# Patient Record
Sex: FEMALE | Race: WHITE | Employment: FULL TIME | URBAN - METROPOLITAN AREA
[De-identification: names, ages, dates, MRNs, and addresses within clinical notes are randomized per-mention and may not be internally consistent; named-entity substitution may affect disease eponyms.]

---

## 2017-10-12 ENCOUNTER — OFFICE VISIT (OUTPATIENT)
Dept: FAMILY MEDICINE CLINIC | Age: 42
End: 2017-10-12

## 2017-10-12 VITALS
RESPIRATION RATE: 16 BRPM | WEIGHT: 189.4 LBS | DIASTOLIC BLOOD PRESSURE: 87 MMHG | BODY MASS INDEX: 27.11 KG/M2 | SYSTOLIC BLOOD PRESSURE: 131 MMHG | HEIGHT: 70 IN | OXYGEN SATURATION: 99 % | TEMPERATURE: 97.1 F | HEART RATE: 82 BPM

## 2017-10-12 DIAGNOSIS — J02.9 SORE THROAT: Primary | ICD-10-CM

## 2017-10-12 DIAGNOSIS — Z23 ENCOUNTER FOR IMMUNIZATION: ICD-10-CM

## 2017-10-12 PROBLEM — F41.1 GAD (GENERALIZED ANXIETY DISORDER): Status: ACTIVE | Noted: 2017-10-12

## 2017-10-12 LAB
S PYO AG THROAT QL: NEGATIVE
VALID INTERNAL CONTROL?: NO

## 2017-10-12 RX ORDER — ESCITALOPRAM OXALATE 10 MG/1
10 TABLET ORAL DAILY
COMMUNITY
End: 2017-12-18 | Stop reason: DRUGHIGH

## 2017-10-12 NOTE — MR AVS SNAPSHOT
Visit Information Date & Time Provider Department Dept. Phone Encounter #  
 10/12/2017 11:00 AM Gilson Winn, [address] [facility] [address] [phone] [id number] Upcoming Health Maintenance Date Due DTaP/Tdap/Td series (1 - Tdap) 11/12/1998 PAP AKA CERVICAL CYTOLOGY 11/12/1998 INFLUENZA AGE 9 TO ADULT 8/1/2017 Allergies as of 10/12/2017  Review Complete On: 10/12/2017 By: Gilson Winn NP Not on File Current Immunizations  Never Reviewed No immunizations on file. Not reviewed this visit You Were Diagnosed With   
  
 Codes Comments Sore throat    -  Primary ICD-10-CM: J02.9 ICD-9-CM: 206 Vitals BP Pulse Temp Resp Height(growth percentile) Weight(growth percentile) 131/87 (BP 1 Location: Left arm, BP Patient Position: Sitting) 82 97.1 °F (36.2 °C) (Oral) 16 5' 9.5\" (1.765 m) 189 lb 6.4 oz (85.9 kg) LMP SpO2 BMI OB Status Smoking Status (LMP Unknown) 99% 27.57 kg/m2 Chemically Induced Never Smoker Vitals History BMI and BSA Data Body Mass Index Body Surface Area  
 27.57 kg/m 2 2.05 m 2 Preferred Pharmacy Pharmacy Name Phone [address], [address] Your Updated Medication List  
  
   
This list is accurate as of: 10/12/17 11:37 AM.  Always use your most recent med list.  
  
  
  
  
 LEXAPRO 10 mg tablet Generic drug:  escitalopram oxalate Take 10 mg by mouth daily. We Performed the Following AMB POC RAPID STREP A [36578 CPT(R)] Introducing [address]! Grant Lundy introduces [employer] patient portal. Now you can access parts of your medical record, email your doctor's office, and request medication refills online. 1. In your internet browser, go to [email or web address] 2. Click on the First Time User? Click Here link in the Sign In box.  You will see the New Member Sign Up page. 3. Enter your BioData Access Code exactly as it appears below. You will not need to use this code after youve completed the sign-up process. If you do not sign up before the expiration date, you must request a new code. · BioData Access Code: [email or web address] Expires: 1/10/2018 10:47 AM 
 
4. Enter the last four digits of your Social Security Number (xxxx) and Date of Birth (mm/dd/yyyy) as indicated and click Submit. You will be taken to the next sign-up page. 5. Create a Audiencet ID. This will be your BioData login ID and cannot be changed, so think of one that is secure and easy to remember. 6. Create a BioData password. You can change your password at any time. 7. Enter your Password Reset Question and Answer. This can be used at a later time if you forget your password. 8. Enter your e-mail address. You will receive e-mail notification when new information is available in [address]. 9. Click Sign Up. You can now view and download portions of your medical record. 10. Click the Download Summary menu link to download a portable copy of your medical information. If you have questions, please visit the Frequently Asked Questions section of the BioData website. Remember, BioData is NOT to be used for urgent needs. For medical emergencies, dial 911. Now available from your iPhone and Android! Please provide this summary of care documentation to your next provider. Your primary care clinician is listed as Sachi Tolentino. If you have any questions after today's visit, please call [phone].

## 2017-10-12 NOTE — PROGRESS NOTES
HISTORY OF PRESENT ILLNESS  Daniela Banks is a 46 y.o. female. HPI  New patient to establish care. Recently located to [place] from [facility]. Over all in good health. C/o sore throat over the past 6 days. Having a mild dry cough and some post nasal drainage. History of chronic sinusitis, had surgery last year. No fever/chills or strep exposure. History of ALFREDO on lexapro. Past Medical History:   Diagnosis Date    Chronic sinusitis      Patient Active Problem List   Diagnosis Code    ALFREDO (generalized anxiety disorder) F41.1     Past Surgical History:   Procedure Laterality Date    HX GYN      HX HEENT  11/2016    sinus surgery     Current Outpatient Prescriptions   Medication Sig    escitalopram oxalate (LEXAPRO) 10 mg tablet Take 10 mg by mouth daily. No current facility-administered medications for this visit. Social History   Substance Use Topics    Smoking status: Never Smoker    Smokeless tobacco: Never Used    Alcohol use No     Visit Vitals    /87 (BP 1 Location: Left arm, BP Patient Position: Sitting)    Pulse 82    Temp 97.1 °F (36.2 °C) (Oral)    Resp 16    Ht 5' 9.5\" (1.765 m)    Wt 189 lb 6.4 oz (85.9 kg)    SpO2 99%    BMI 27.57 kg/m2         Review of Systems   Constitutional: Negative for chills, fever and malaise/fatigue. HENT: Negative for congestion, ear pain and sore throat. Respiratory: Positive for cough. Negative for sputum production, shortness of breath and wheezing. Cardiovascular: Negative for chest pain and palpitations. Psychiatric/Behavioral: The patient is nervous/anxious (Stable on lexapro). All other systems reviewed and are negative. Physical Exam   Constitutional: No distress. HENT:   Right Ear: Tympanic membrane and ear canal normal.   Left Ear: Tympanic membrane and ear canal normal.   Nose: Right sinus exhibits no maxillary sinus tenderness and no frontal sinus tenderness.  Left sinus exhibits no maxillary sinus tenderness and no frontal sinus tenderness. Mouth/Throat: Posterior oropharyngeal erythema present. No oropharyngeal exudate or posterior oropharyngeal edema. Neck: Neck supple. Cardiovascular: Normal rate, regular rhythm and normal heart sounds. Pulmonary/Chest: Effort normal and breath sounds normal.   Lymphadenopathy:     She has no cervical adenopathy. Skin: Skin is warm and dry. ASSESSMENT and PLAN  Diagnoses and all orders for this visit:    1. Sore throat  -     AMB POC RAPID STREP A    Rapid strep negative. May be secondary to drainage or mild URI. Trial mucinex as directed. Warm salt/water gargles as directed. Ibuprofen prn. Follow up if sx worsen or FTI.

## 2017-10-12 NOTE — PROGRESS NOTES
Chief Complaint   Patient presents with    Complete Physical    Cold Symptoms     sore throat, fever, x 6 days      1. Have you been to the ER, urgent care clinic since your last visit? Hospitalized since your last visit? Yes When: august [facility]     2. Have you seen or consulted any other health care providers outside of the [address] [place] [address] since your last visit? Include any pap smears or colon screening.  No

## 2017-11-30 ENCOUNTER — OFFICE VISIT (OUTPATIENT)
Dept: FAMILY MEDICINE CLINIC | Age: 42
End: 2017-11-30

## 2017-11-30 VITALS
BODY MASS INDEX: 25.91 KG/M2 | WEIGHT: 181 LBS | RESPIRATION RATE: 20 BRPM | SYSTOLIC BLOOD PRESSURE: 139 MMHG | TEMPERATURE: 97.5 F | HEART RATE: 76 BPM | HEIGHT: 70 IN | DIASTOLIC BLOOD PRESSURE: 80 MMHG | OXYGEN SATURATION: 99 %

## 2017-11-30 DIAGNOSIS — J32.0 CHRONIC MAXILLARY SINUSITIS: Primary | ICD-10-CM

## 2017-11-30 RX ORDER — AMOXICILLIN AND CLAVULANATE POTASSIUM 875; 125 MG/1; MG/1
1 TABLET, FILM COATED ORAL EVERY 12 HOURS
Qty: 20 TAB | Refills: 0 | Status: SHIPPED | OUTPATIENT
Start: 2017-11-30 | End: 2017-12-10

## 2017-11-30 NOTE — PROGRESS NOTES
Chief Complaint   Patient presents with    Sinus Infection     headaches, sinus pressure X 2 weeks      1. Have you been to the ER, urgent care clinic since your last visit? Hospitalized since your last visit? No    2. Have you seen or consulted any other health care providers outside of the [address] [place] [address] since your last visit? Include any pap smears or colon screening.  No

## 2017-11-30 NOTE — MR AVS SNAPSHOT
Visit Information Date & Time Provider Department Dept. Phone Encounter #  
 11/30/2017  5:45 PM Beth Rosa  [facility] [facility] [address] [phone] [id number] Upcoming Health Maintenance Date Due DTaP/Tdap/Td series (1 - Tdap) 11/12/1998 PAP AKA CERVICAL CYTOLOGY 11/12/1998 Allergies as of 11/30/2017  Review Complete On: 11/30/2017 By: Beth Rosa NP No Known Allergies Current Immunizations  Never Reviewed Name Date Influenza Vaccine (Quad) PF 10/12/2017 11:54 AM  
  
 Not reviewed this visit You Were Diagnosed With   
  
 Codes Comments Chronic maxillary sinusitis    -  Primary ICD-10-CM: J32.0 ICD-9-CM: 473.0 Vitals BP Pulse Temp Resp Height(growth percentile) Weight(growth percentile) 139/80 (BP 1 Location: Left arm, BP Patient Position: Sitting) 76 97.5 °F (36.4 °C) (Oral) 20 5' 9.5\" (1.765 m) 181 lb (82.1 kg) LMP SpO2 BMI OB Status Smoking Status 11/21/2017 99% 26.35 kg/m2 Having regular periods Never Smoker Vitals History BMI and BSA Data Body Mass Index Body Surface Area  
 26.35 kg/m 2 2.01 m 2 Preferred Pharmacy Pharmacy Name Phone [address], [address] Your Updated Medication List  
  
   
This list is accurate as of: 11/30/17  6:59 PM.  Always use your most recent med list.  
  
  
  
  
 amoxicillin-clavulanate 875-125 mg per tablet Commonly known as:  AUGMENTIN Take 1 Tab by mouth every twelve (12) hours for 10 days. LEXAPRO 10 mg tablet Generic drug:  escitalopram oxalate Take 10 mg by mouth daily. OTHER(NON-FORMULARY) Take  by mouth daily. Indications: Cilest 250/35 mcg tablets Prescriptions Sent to Pharmacy Refills  
 amoxicillin-clavulanate (AUGMENTIN) 875-125 mg per tablet 0 Sig: Take 1 Tab by mouth every twelve (12) hours for 10 days. Class: Normal  
 Pharmacy: [address] [facility], [facility] [address]  #: [phone] Route: Oral  
  
We Performed the Following REFERRAL TO ENT-OTOLARYNGOLOGY [GPA34 Custom] Referral Information Referral ID Referred By Referred To  
  
 [id number] [facility]   
   [address] [facility] [address] [place], [address] Visits Status Start Date End Date 1 New Request 11/30/17 11/30/18 If your referral has a status of pending review or denied, additional information will be sent to support the outcome of this decision. Patient Instructions Chronic Sinusitis: Care Instructions Your Care Instructions Sinusitis is an infection of the lining of the sinus cavities in your head. It causes pain and pressure in your head and face. Sinusitis can be short-term (acute) or long-term (chronic). Chronic sinusitis lasts 12 weeks or longer. It is often caused by a bacterial or fungal infection. Other things, such as allergies, may also be involved. Chronic sinusitis may be hard to treat. It can lead to permanent changes in the mucous membranes that line the sinuses. It may make future sinus infections more likely. The infection may take some time to treat. Antibiotics are usually used if the infection is caused by bacteria. You may also need to use a corticosteroid nasal spray. If the infection is not cured after you try two or more different antibiotics, you may want to talk with your doctor about surgery or allergy testing. If the sinusitis is caused by a fungal infection, you may need to take antifungals or other medicines. You may also need surgery. Follow-up care is a key part of your treatment and safety. Be sure to make and go to all appointments, and call your doctor if you are having problems. It's also a good idea to know your test results and keep a list of the medicines you take. How can you care for yourself at home? Medicines ? · Be safe with medicines. Take your medicines exactly as prescribed. Call your doctor if you think you are having a problem with your medicine. You will get more details on the specific medicines your doctor prescribes. ? · Take your antibiotics as directed. Do not stop taking them just because you feel better. You need to take the full course of antibiotics. ? · Your doctor may recommend a corticosteroid nasal spray, wash, drops, or pills. Take this medicine exactly as prescribed. ?At home ? · Breathe warm, moist air. You can use a steamy shower, a hot bath, or a sink filled with hot water. Avoid cold, dry air. Using a humidifier in your home may help. Follow the instructions for cleaning the machine. ? · Use saline (saltwater) nasal washes every day. This helps keep your nasal passages open. It also can wash out mucus and bacteria. ¨ You can buy saline nose drops at a grocery store or drugstore. ¨ You can make your own at home. Add 1 teaspoon of salt and 1 teaspoon of baking soda to 2 cups of distilled water. If you make your own, fill a bulb syringe with the solution. Then insert the tip into your nostril and squeeze gently. Wilma Spindle your nose. ? · Put a warm, wet towel or a warm gel pack on your face 3 or 4 times a day. Leave it on 5 to 10 minutes each time. ? · Do not smoke or breathe secondhand smoke. Smoking can make sinusitis worse. If you need help quitting, talk to your doctor about stop-smoking programs and medicines. These can increase your chances of quitting for good. When should you call for help? Call your doctor now or seek immediate medical care if: 
? · You have new or worse symptoms of infection, such as: 
¨ Increased pain, swelling, warmth, or redness. ¨ Red streaks leading from the area. ¨ Pus draining from the area. ¨ A fever. ? Watch closely for changes in your health, and be sure to contact your doctor if: ? · The mucus from your nose becomes thicker (like pus) or has new blood in it. ? · You do not get better as expected. Where can you learn more? Go to http://ignacio-karolina.info/. Enter U777 in the search box to learn more about \"Chronic Sinusitis: Care Instructions. \" Current as of: May 12, 2017 Content Version: 11.4 © 6941-7133 [employer]. Care instructions adapted under license by [employer] (which disclaims liability or warranty for this information). If you have questions about a medical condition or this instruction, always ask your healthcare professional. [address] any warranty or liability for your use of this information. Introducing [facility]! [facility] introduces [employer] patient portal. Now you can access parts of your medical record, email your doctor's office, and request medication refills online. 1. In your internet browser, go to [email or web address] 2. Click on the First Time User? Click Here link in the Sign In box. You will see the New Member Sign Up page. 3. Enter your Flashstarts Access Code exactly as it appears below. You will not need to use this code after youve completed the sign-up process. If you do not sign up before the expiration date, you must request a new code. · Flashstarts Access Code: [email or web address] Expires: 1/10/2018  9:47 AM 
 
4. Enter the last four digits of your Social Security Number (xxxx) and Date of Birth (mm/dd/yyyy) as indicated and click Submit. You will be taken to the next sign-up page. 5. Create a Flashstarts ID. This will be your Flashstarts login ID and cannot be changed, so think of one that is secure and easy to remember. 6. Create a Flashstarts password. You can change your password at any time. 7. Enter your Password Reset Question and Answer. This can be used at a later time if you forget your password. 8. Enter your e-mail address. You will receive e-mail notification when new information is available in [address] 9. Click Sign Up. You can now view and download portions of your medical record. 10. Click the Download Summary menu link to download a portable copy of your medical information. If you have questions, please visit the Frequently Asked Questions section of the Emair website. Remember, Emair is NOT to be used for urgent needs. For medical emergencies, dial 911. Now available from your iPhone and Android! Please provide this summary of care documentation to your next provider. Your primary care clinician is listed as Alba Ly. If you have any questions after today's visit, please call [phone].

## 2017-11-30 NOTE — PATIENT INSTRUCTIONS
Chronic Sinusitis: Care Instructions  Your Care Instructions    Sinusitis is an infection of the lining of the sinus cavities in your head. It causes pain and pressure in your head and face. Sinusitis can be short-term (acute) or long-term (chronic). Chronic sinusitis lasts 12 weeks or longer. It is often caused by a bacterial or fungal infection. Other things, such as allergies, may also be involved. Chronic sinusitis may be hard to treat. It can lead to permanent changes in the mucous membranes that line the sinuses. It may make future sinus infections more likely. The infection may take some time to treat. Antibiotics are usually used if the infection is caused by bacteria. You may also need to use a corticosteroid nasal spray. If the infection is not cured after you try two or more different antibiotics, you may want to talk with your doctor about surgery or allergy testing. If the sinusitis is caused by a fungal infection, you may need to take antifungals or other medicines. You may also need surgery. Follow-up care is a key part of your treatment and safety. Be sure to make and go to all appointments, and call your doctor if you are having problems. It's also a good idea to know your test results and keep a list of the medicines you take. How can you care for yourself at home? Medicines  ? · Be safe with medicines. Take your medicines exactly as prescribed. Call your doctor if you think you are having a problem with your medicine. You will get more details on the specific medicines your doctor prescribes. ? · Take your antibiotics as directed. Do not stop taking them just because you feel better. You need to take the full course of antibiotics. ? · Your doctor may recommend a corticosteroid nasal spray, wash, drops, or pills. Take this medicine exactly as prescribed. ?At home  ? · Breathe warm, moist air. You can use a steamy shower, a hot bath, or a sink filled with hot water.  Avoid cold, dry air. Using a humidifier in your home may help. Follow the instructions for cleaning the machine. ? · Use saline (saltwater) nasal washes every day. This helps keep your nasal passages open. It also can wash out mucus and bacteria. ¨ You can buy saline nose drops at a grocery store or drugstore. ¨ You can make your own at home. Add 1 teaspoon of salt and 1 teaspoon of baking soda to 2 cups of distilled water. If you make your own, fill a bulb syringe with the solution. Then insert the tip into your nostril and squeeze gently. Isela Ely your nose. ? · Put a warm, wet towel or a warm gel pack on your face 3 or 4 times a day. Leave it on 5 to 10 minutes each time. ? · Do not smoke or breathe secondhand smoke. Smoking can make sinusitis worse. If you need help quitting, talk to your doctor about stop-smoking programs and medicines. These can increase your chances of quitting for good. When should you call for help? Call your doctor now or seek immediate medical care if:  ? · You have new or worse symptoms of infection, such as:  ¨ Increased pain, swelling, warmth, or redness. ¨ Red streaks leading from the area. ¨ Pus draining from the area. ¨ A fever. ? Watch closely for changes in your health, and be sure to contact your doctor if:  ? · The mucus from your nose becomes thicker (like pus) or has new blood in it. ? · You do not get better as expected. Where can you learn more? Go to http://ignacio-karolina.info/. Enter L833 in the search box to learn more about \"Chronic Sinusitis: Care Instructions. \"  Current as of: May 12, 2017  Content Version: 11.4  © 6433-7413 [email or web address]. Care instructions adapted under license by [employer] (which disclaims liability or warranty for this information).  If you have questions about a medical condition or this instruction, always ask your healthcare professional. [address]  any warranty or liability for your use of this information.

## 2017-11-30 NOTE — PROGRESS NOTES
Assessment/Plan:     Diagnoses and all orders for this visit:    1. Chronic maxillary sinusitis  -     amoxicillin-clavulanate (AUGMENTIN) 875-125 mg per tablet; Take 1 Tab by mouth every twelve (12) hours for 10 days.  -     REFERRAL TO ENT-OTOLARYNGOLOGY    Treatment as above. Discussed restarting nasal steroids. Due to complicated history, will establish with ENT as well. Follow-up Disposition:  Return if symptoms worsen or fail to improve. Discussed expected course/resolution/complications of diagnosis in detail with patient.    Medication risks/benefits/costs/interactions/alternatives discussed with patient.    Pt was given after visit summary which includes diagnoses, current medications & vitals. Pt expressed understanding with the diagnosis and plan          Subjective:      Paul Domínguez is a 38 y.o. female who presents for had concerns including Sinus Infection. Upper Respiratory Infection  Patient complains of possible sinusitis. Symptoms include congestion. Onset of symptoms was several weeks ago, unchanged since that time. She also c/o congestion, facial pain and nasal congestion for the past several weeks . She is drinking plenty of fluids. . Evaluation to date: none. Treatment to date: none, which have been ineffective. Current Outpatient Prescriptions   Medication Sig Dispense Refill    OTHER,NON-FORMULARY, Take  by mouth daily. Indications: Cilest 250/35 mcg tablets      amoxicillin-clavulanate (AUGMENTIN) 875-125 mg per tablet Take 1 Tab by mouth every twelve (12) hours for 10 days. 20 Tab 0    escitalopram oxalate (LEXAPRO) 10 mg tablet Take 10 mg by mouth daily. No Known Allergies    ROS:   Complete review of systems was reviewed with pertinent information listed in HPI.     Objective:     Visit Vitals    /80 (BP 1 Location: Left arm, BP Patient Position: Sitting)    Pulse 76    Temp 97.5 °F (36.4 °C) (Oral)    Resp 20    Ht 5' 9.5\" (1.765 m)    Wt 181 lb (82.1 kg)    LMP 11/21/2017    SpO2 99%    BMI 26.35 kg/m2       Vitals and Nurse Documentation reviewed. Physical Exam   Constitutional: No distress. HENT:   Right Ear: Tympanic membrane is not erythematous and not bulging. No middle ear effusion. Left Ear: Tympanic membrane is not erythematous and not bulging. No middle ear effusion. Nose: Mucosal edema and rhinorrhea (purulent) present. Right sinus exhibits maxillary sinus tenderness and frontal sinus tenderness. Left sinus exhibits maxillary sinus tenderness and frontal sinus tenderness. Mouth/Throat: No oropharyngeal exudate or posterior oropharyngeal erythema. Cardiovascular: S1 normal and S2 normal.  Exam reveals no gallop and no friction rub. No murmur heard. Pulmonary/Chest: Breath sounds normal. She has no wheezes. Lymphadenopathy:     She has no cervical adenopathy.    Psychiatric: Mood and affect normal.

## 2017-12-18 ENCOUNTER — OFFICE VISIT (OUTPATIENT)
Dept: FAMILY MEDICINE CLINIC | Age: 42
End: 2017-12-18

## 2017-12-18 VITALS
HEIGHT: 70 IN | RESPIRATION RATE: 18 BRPM | DIASTOLIC BLOOD PRESSURE: 82 MMHG | TEMPERATURE: 98.3 F | SYSTOLIC BLOOD PRESSURE: 122 MMHG | WEIGHT: 186 LBS | BODY MASS INDEX: 26.63 KG/M2 | HEART RATE: 65 BPM

## 2017-12-18 DIAGNOSIS — Z30.41 ENCOUNTER FOR BIRTH CONTROL PILLS MAINTENANCE: ICD-10-CM

## 2017-12-18 DIAGNOSIS — F41.1 GAD (GENERALIZED ANXIETY DISORDER): Primary | ICD-10-CM

## 2017-12-18 DIAGNOSIS — E66.3 OVERWEIGHT (BMI 25.0-29.9): ICD-10-CM

## 2017-12-18 RX ORDER — NORGESTIMATE AND ETHINYL ESTRADIOL 0.25-0.035
1 KIT ORAL DAILY
COMMUNITY
End: 2017-12-18 | Stop reason: SDUPTHER

## 2017-12-18 RX ORDER — ESCITALOPRAM OXALATE 10 MG/1
10 TABLET ORAL DAILY
Qty: 30 TAB | Refills: 0 | Status: CANCELLED | OUTPATIENT
Start: 2017-12-18

## 2017-12-18 RX ORDER — NORGESTIMATE AND ETHINYL ESTRADIOL 0.25-0.035
1 KIT ORAL DAILY
Qty: 1 PACKAGE | Refills: 6 | Status: SHIPPED | OUTPATIENT
Start: 2017-12-18 | End: 2018-06-28 | Stop reason: SDUPTHER

## 2017-12-18 RX ORDER — ESCITALOPRAM OXALATE 20 MG/1
20 TABLET ORAL DAILY
Qty: 30 TAB | Refills: 5 | Status: SHIPPED | OUTPATIENT
Start: 2017-12-18 | End: 2018-06-06 | Stop reason: SDUPTHER

## 2017-12-18 NOTE — PROGRESS NOTES
Chief Complaint   Patient presents with    Medication Refill     Pt presents in office today for medication refill    1. Have you been to the ER, urgent care clinic since your last visit? Hospitalized since your last visit? No    2. Have you seen or consulted any other health care providers outside of the [address] [place] [address] since your last visit? Include any pap smears or colon screening.  No

## 2017-12-18 NOTE — MR AVS SNAPSHOT
Visit Information Date & Time Provider Department Dept. Phone Encounter #  
 12/18/2017  5:00 PM Laurent Richmond, [address] [facility] [address] [phone] [id number] Upcoming Health Maintenance Date Due DTaP/Tdap/Td series (1 - Tdap) 11/12/1998 PAP AKA CERVICAL CYTOLOGY 11/12/1998 Allergies as of 12/18/2017  Review Complete On: 12/18/2017 By: Laurent Richmond, KIRSTY No Known Allergies Current Immunizations  Never Reviewed Name Date Influenza Vaccine (Quad) PF 10/12/2017 11:54 AM  
  
 Not reviewed this visit You Were Diagnosed With   
  
 Codes Comments ALFREDO (generalized anxiety disorder)    -  Primary ICD-10-CM: F41.1 ICD-9-CM: 300.02 Encounter for birth control pills maintenance     ICD-10-CM: Z30.41 ICD-9-CM: V25.41 Vitals BP Pulse Temp Resp Height(growth percentile) Weight(growth percentile) 122/82 65 98.3 °F (36.8 °C) (Oral) 18 5' 9.5\" (1.765 m) 186 lb (84.4 kg) LMP BMI OB Status Smoking Status 11/21/2017 27.07 kg/m2 Having regular periods Never Smoker Vitals History BMI and BSA Data Body Mass Index Body Surface Area  
 27.07 kg/m 2 2.03 m 2 Preferred Pharmacy Pharmacy Name Phone [address], [facility] [address] Your Updated Medication List  
  
   
This list is accurate as of: 12/18/17  5:22 PM.  Always use your most recent med list.  
  
  
  
  
 escitalopram oxalate 20 mg tablet Commonly known as:  Darcus Skillern Take 1 Tab by mouth daily. norgestimate-ethinyl estradiol 0.25-35 mg-mcg Tab Commonly known as:  Junius Portal Take 1 Tab by mouth daily. OTHER(NON-FORMULARY) Take  by mouth daily. Indications: Cilest 250/35 mcg tablets Prescriptions Sent to Pharmacy Refills  
 norgestimate-ethinyl estradiol (ORTHO-CYCLEN, SPRINTEC) 0.25-35 mg-mcg tab 6 Sig: Take 1 Tab by mouth daily. Class: Normal  
 Pharmacy: [facility], [address] [place] [address] [address] [place] [address] Ph #: [phone] Route: Oral  
 escitalopram oxalate (LEXAPRO) 20 mg tablet 5 Sig: Take 1 Tab by mouth daily. Class: Normal  
 Pharmacy: [facility], [address] [place] [address] 71 [address] [facility] Ph #: [phone] Route: Oral  
  
Introducing [facility] SERVICES! Ingris Denney introduces Netskope patient portal. Now you can access parts of your medical record, email your doctor's office, and request medication refills online. 1. In your internet browser, go to [email or web address] 2. Click on the First Time User? Click Here link in the Sign In box. You will see the New Member Sign Up page. 3. Enter your Netskope Access Code exactly as it appears below. You will not need to use this code after youve completed the sign-up process. If you do not sign up before the expiration date, you must request a new code. · Netskope Access Code: [email or web address] Expires: 1/10/2018  9:47 AM 
 
4. Enter the last four digits of your Social Security Number (xxxx) and Date of Birth (mm/dd/yyyy) as indicated and click Submit. You will be taken to the next sign-up page. 5. Create a Netskope ID. This will be your Netskope login ID and cannot be changed, so think of one that is secure and easy to remember. 6. Create a Netskope password. You can change your password at any time. 7. Enter your Password Reset Question and Answer. This can be used at a later time if you forget your password. 8. Enter your e-mail address. You will receive e-mail notification when new information is available in [address] 9. Click Sign Up. You can now view and download portions of your medical record. 10. Click the Download Summary menu link to download a portable copy of your medical information. If you have questions, please visit the Frequently Asked Questions section of the [employer] website. Remember, HipLink is NOT to be used for urgent needs. For medical emergencies, dial 911. Now available from your iPhone and Android! Please provide this summary of care documentation to your next provider. Your primary care clinician is listed as Domi Sher. If you have any questions after today's visit, please call [phone].

## 2018-06-06 DIAGNOSIS — F41.1 GAD (GENERALIZED ANXIETY DISORDER): ICD-10-CM

## 2018-06-06 RX ORDER — ESCITALOPRAM OXALATE 20 MG/1
TABLET ORAL
Qty: 30 TAB | Refills: 0 | Status: SHIPPED | OUTPATIENT
Start: 2018-06-06 | End: 2018-06-28 | Stop reason: SDUPTHER

## 2018-06-26 ENCOUNTER — HOSPITAL ENCOUNTER (OUTPATIENT)
Dept: LAB | Age: 43
Discharge: HOME | End: 2018-06-26
Payer: COMMERCIAL

## 2018-06-26 ENCOUNTER — OFFICE VISIT (OUTPATIENT)
Dept: FAMILY MEDICINE CLINIC | Age: 43
End: 2018-06-26

## 2018-06-26 VITALS
WEIGHT: 192.6 LBS | HEIGHT: 70 IN | TEMPERATURE: 98.9 F | OXYGEN SATURATION: 98 % | DIASTOLIC BLOOD PRESSURE: 82 MMHG | BODY MASS INDEX: 27.57 KG/M2 | RESPIRATION RATE: 18 BRPM | HEART RATE: 82 BPM | SYSTOLIC BLOOD PRESSURE: 120 MMHG

## 2018-06-26 DIAGNOSIS — Z12.39 SCREENING FOR MALIGNANT NEOPLASM OF BREAST: ICD-10-CM

## 2018-06-26 DIAGNOSIS — Z01.419 WELL WOMAN EXAM WITH ROUTINE GYNECOLOGICAL EXAM: ICD-10-CM

## 2018-06-26 DIAGNOSIS — Z01.419 WELL WOMAN EXAM WITH ROUTINE GYNECOLOGICAL EXAM: Primary | ICD-10-CM

## 2018-06-26 DIAGNOSIS — E66.3 OVERWEIGHT (BMI 25.0-29.9): ICD-10-CM

## 2018-06-26 PROCEDURE — 88175 CYTOPATH C/V AUTO FLUID REDO: CPT | Performed by: FAMILY MEDICINE

## 2018-06-26 PROCEDURE — 87624 HPV HI-RISK TYP POOLED RSLT: CPT | Performed by: FAMILY MEDICINE

## 2018-06-26 NOTE — MR AVS SNAPSHOT
[facility] 
 
 
 [facility] [address] [place] [address] 
[phone] Patient: Kyler Lane MRN: [id number] :1975 Visit Information Date & Time Provider Department Dept. Phone Encounter #  
 2018  9:30 AM Georgette Perez  [facility] [phone] [id number] Follow-up Instructions Return in about 1 year (around 2019) for Follow Up, Physical exam. Upcoming Health Maintenance Date Due  
 PAP AKA CERVICAL CYTOLOGY 1998 Influenza Age 5 to Adult 2018 DTaP/Tdap/Td series (2 - Td) 2027 Allergies as of 2018  Review Complete On: 2018 By: Annie Patel No Known Allergies Current Immunizations  Never Reviewed Name Date Influenza Vaccine (Quad) PF 10/12/2017 11:54 AM  
  
 Not reviewed this visit You Were Diagnosed With   
  
 Codes Comments Well woman exam with routine gynecological exam    -  Primary ICD-10-CM: C41.171 ICD-9-CM: V72.31 Overweight (BMI 25.0-29. 9)     ICD-10-CM: E41.9 ICD-9-CM: 278.02 Screening for malignant neoplasm of breast     ICD-10-CM: Z12.31 
ICD-9-CM: V76.10 Vitals BP Pulse Temp Resp Height(growth percentile) Weight(growth percentile) 120/82 (BP 1 Location: Right arm, BP Patient Position: Sitting) 82 98.9 °F (37.2 °C) (Oral) 18 5' 9.5\" (1.765 m) 192 lb 9.6 oz (87.4 kg) LMP SpO2 BMI OB Status Smoking Status 2018 (Exact Date) 98% 28.03 kg/m2 Having regular periods Never Smoker BMI and BSA Data Body Mass Index Body Surface Area 28.03 kg/m 2 2.07 m 2 Preferred Pharmacy Pharmacy Name Phone [address], [facility] [address] Your Updated Medication List  
  
   
This list is accurate as of 18 10:03 AM.  Always use your most recent med list.  
  
  
  
  
 escitalopram oxalate 20 mg tablet Commonly known as:  Hortensia Roof TAKE 1 TABLET BY MOUTH DAILY  
  
 norgestimate-ethinyl estradiol 0.25-35 mg-mcg Tab Commonly known as:  Keesha Fellers Take 1 Tab by mouth daily. OTHER(NON-FORMULARY) Take  by mouth daily. Indications: Cilest 250/35 mcg tablets We Performed the Following CBC WITH AUTOMATED DIFF [42761 CPT(R)] HEMOGLOBIN A1C WITH EAG [61744 CPT(R)] LIPID PANEL [83029 CPT(R)] METABOLIC PANEL, COMPREHENSIVE [38141 CPT(R)] Follow-up Instructions Return in about 1 year (around 6/26/2019) for Follow Up, Physical exam. To-Do List   
 06/26/2018 Imaging:  CHASE MAMMO BI SCREENING INCL CAD   
  
 06/26/2018 Pathology:  PAP IG, APTIMA HPV AND RFX 16/18,45 (255304) Patient Instructions Well Visit, Ages 25 to 48: Care Instructions Your Care Instructions Physical exams can help you stay healthy. Your doctor has checked your overall health and may have suggested ways to take good care of yourself. He or she also may have recommended tests. At home, you can help prevent illness with healthy eating, regular exercise, and other steps. Follow-up care is a key part of your treatment and safety. Be sure to make and go to all appointments, and call your doctor if you are having problems. It's also a good idea to know your test results and keep a list of the medicines you take. How can you care for yourself at home? · Reach and stay at a healthy weight. This will lower your risk for many problems, such as obesity, diabetes, heart disease, and high blood pressure. · Get at least 30 minutes of physical activity on most days of the week. Walking is a good choice. You also may want to do other activities, such as running, swimming, cycling, or playing tennis or team sports. Discuss any changes in your exercise program with your doctor. · Do not smoke or allow others to smoke around you.  If you need help quitting, talk to your doctor about stop-smoking programs and medicines. These can increase your chances of quitting for good. · Talk to your doctor about whether you have any risk factors for sexually transmitted infections (STIs). Having one sex partner (who does not have STIs and does not have sex with anyone else) is a good way to avoid these infections. · Use birth control if you do not want to have children at this time. Talk with your doctor about the choices available and what might be best for you. · Protect your skin from too much sun. When you're outdoors from 10 a.m. to 4 p.m., stay in the shade or cover up with clothing and a hat with a wide brim. Wear sunglasses that block UV rays. Even when it's cloudy, put broad-spectrum sunscreen (SPF 30 or higher) on any exposed skin. · See a dentist one or two times a year for checkups and to have your teeth cleaned. · Wear a seat belt in the car. · Drink alcohol in moderation, if at all. That means no more than 2 drinks a day for men and 1 drink a day for women. Follow your doctor's advice about when to have certain tests. These tests can spot problems early. For everyone · Cholesterol. Have the fat (cholesterol) in your blood tested after age 21. Your doctor will tell you how often to have this done based on your age, family history, or other things that can increase your risk for heart disease. · Blood pressure. Have your blood pressure checked during a routine doctor visit. Your doctor will tell you how often to check your blood pressure based on your age, your blood pressure results, and other factors. · Vision. Talk with your doctor about how often to have a glaucoma test. 
· Diabetes. Ask your doctor whether you should have tests for diabetes. · Colon cancer. Have a test for colon cancer at age 48. You may have one of several tests.  If you are younger than 48, you may need a test earlier if you have any risk factors. Risk factors include whether you already had a precancerous polyp removed from your colon or whether your parent, brother, sister, or child has had colon cancer. For women · Breast exam and mammogram. Talk to your doctor about when you should have a clinical breast exam and a mammogram. Medical experts differ on whether and how often women under 50 should have these tests. Your doctor can help you decide what is right for you. · Pap test and pelvic exam. Begin Pap tests at age 24. A Pap test is the best way to find cervical cancer. The test often is part of a pelvic exam. Ask how often to have this test. 
· Tests for sexually transmitted infections (STIs). Ask whether you should have tests for STIs. You may be at risk if you have sex with more than one person, especially if your partners do not wear condoms. For men · Tests for sexually transmitted infections (STIs). Ask whether you should have tests for STIs. You may be at risk if you have sex with more than one person, especially if you do not wear a condom. · Testicular cancer exam. Ask your doctor whether you should check your testicles regularly. · Prostate exam. Talk to your doctor about whether you should have a blood test (called a PSA test) for prostate cancer. Experts differ on whether and when men should have this test. Some experts suggest it if you are older than 39 and are -American or have a father or brother who got prostate cancer when he was younger than 72. When should you call for help? Watch closely for changes in your health, and be sure to contact your doctor if you have any problems or symptoms that concern you. Where can you learn more? Go to http://ignacio-karolina.info/. Enter P072 in the search box to learn more about \"Well Visit, Ages 25 to 48: Care Instructions. \" Current as of: May 12, 2017 Content Version: 11.4 © 8410-9637 Healthwise, Incorporated. Care instructions adapted under license by [employer] (which disclaims liability or warranty for this information). If you have questions about a medical condition or this instruction, always ask your healthcare professional. [address] any warranty or liability for your use of this information. Introducing [facility]! Leoncio Jones introduces Shangby patient portal. Now you can access parts of your medical record, email your doctor's office, and request medication refills online. 1. In your internet browser, go to [email or web address]. [employer] 2. Click on the First Time User? Click Here link in the Sign In box. You will see the New Member Sign Up page. 3. Enter your Shangby Access Code exactly as it appears below. You will not need to use this code after youve completed the sign-up process. If you do not sign up before the expiration date, you must request a new code. · Shangby Access Code: [email or web address] Expires: 9/24/2018 10:03 AM 
 
4. Enter the last four digits of your Social Security Number (xxxx) and Date of Birth (mm/dd/yyyy) as indicated and click Submit. You will be taken to the next sign-up page. 5. Create a Shangby ID. This will be your Shangby login ID and cannot be changed, so think of one that is secure and easy to remember. 6. Create a Shangby password. You can change your password at any time. 7. Enter your Password Reset Question and Answer. This can be used at a later time if you forget your password. 8. Enter your e-mail address. You will receive e-mail notification when new information is available in [address] 9. Click Sign Up. You can now view and download portions of your medical record. 10. Click the Download Summary menu link to download a portable copy of your medical information.  
 
If you have questions, please visit the Frequently Asked Questions section of the [employer]. Remember, Frediohart is NOT to be used for urgent needs. For medical emergencies, dial 911. Now available from your iPhone and Android! Please provide this summary of care documentation to your next provider. Your primary care clinician is listed as Greg Solares. If you have any questions after today's visit, please call [phone].

## 2018-06-26 NOTE — LETTER
7/2/2018 3:36 PM 
 
Ms. Susie Brayk[address]linelvia [address] [facility] [address] [place]: 
 
Please find your most recent results below. Resulted Orders LIPID PANEL Result Value Ref Range Cholesterol, total 148 100 - 199 mg/dL Triglyceride 63 0 - 149 mg/dL HDL Cholesterol 45 >39 mg/dL VLDL, calculated 13 5 - 40 mg/dL LDL, calculated 90 0 - 99 mg/dL Narrative Performed at:  [address]  [id number] : Donnie Mendoza MD, Phone:  [phone] METABOLIC PANEL, COMPREHENSIVE Result Value Ref Range Glucose 96 65 - 99 mg/dL BUN 12 6 - 24 mg/dL Creatinine 0.62 0.57 - 1.00 mg/dL GFR est non- >59 mL/min/1.73 GFR est  >59 mL/min/1.73  
 BUN/Creatinine ratio 19 9 - 23 Sodium 141 134 - 144 mmol/L Potassium 4.3 3.5 - 5.2 mmol/L Chloride 104 96 - 106 mmol/L  
 CO2 24 20 - 29 mmol/L Comment: **Please note reference interval change** Calcium 9.0 8.7 - 10.2 mg/dL Protein, total 6.5 6.0 - 8.5 g/dL Albumin 4.1 3.5 - 5.5 g/dL GLOBULIN, TOTAL 2.4 1.5 - 4.5 g/dL A-G Ratio 1.7 1.2 - 2.2 Bilirubin, total 0.5 0.0 - 1.2 mg/dL Alk. phosphatase 53 39 - 117 IU/L  
 AST (SGOT) 20 0 - 40 IU/L  
 ALT (SGPT) 21 0 - 32 IU/L Narrative Performed at:  [address]  [id number] : Donnie Mendoza MD, Phone:  [phone] CBC WITH AUTOMATED DIFF Result Value Ref Range WBC 5.1 3.4 - 10.8 x10E3/uL  
 RBC 4.42 3.77 - 5.28 x10E6/uL HGB 13.2 11.1 - 15.9 g/dL HCT 39.5 34.0 - 46.6 % MCV 89 79 - 97 fL  
 MCH 29.9 26.6 - 33.0 pg  
 MCHC 33.4 31.5 - 35.7 g/dL  
 RDW 12.8 12.3 - 15.4 % PLATELET 508 091 - 318 x10E3/uL NEUTROPHILS 64 Not Estab. % Lymphocytes 28 Not Estab. % MONOCYTES 5 Not Estab. % EOSINOPHILS 3 Not Estab. %  BASOPHILS 0 Not Estab. %  
 ABS. NEUTROPHILS 3.3 1.4 - 7.0 x10E3/uL Abs Lymphocytes 1.4 0.7 - 3.1 x10E3/uL  
 ABS. MONOCYTES 0.3 0.1 - 0.9 x10E3/uL  
 ABS. EOSINOPHILS 0.1 0.0 - 0.4 x10E3/uL  
 ABS. BASOPHILS 0.0 0.0 - 0.2 x10E3/uL IMMATURE GRANULOCYTES 0 Not Estab. %  
 ABS. IMM. GRANS. 0.0 0.0 - 0.1 x10E3/uL Narrative Performed at:  [address]  [id number] : Debbei Titus MD, Phone:  [phone] HEMOGLOBIN A1C WITH EAG Result Value Ref Range Hemoglobin A1c 4.9 4.8 - 5.6 % Comment:  
            Pre-diabetes: 5.7 - 6.4 Diabetes: >6.4 Glycemic control for adults with diabetes: <7.0 Estimated average glucose 94 mg/dL Narrative Performed at:  [address]  [id number] : Debbie Titus MD, Phone:  [phone] CVD REPORT Result Value Ref Range INTERPRETATION Note Comment:  
   Supplemental report is available. Narrative Performed at:  [address] [place] [address]  [id number] : Belen Pathak MD, Phone:  [phone] RECOMMENDATIONS: 
All labs are normal.  
 
Please call me if you have any questions: [phone] Sincerely, Hieu Coello MD

## 2018-06-26 NOTE — PROGRESS NOTES
Chief Complaint   Patient presents with    Complete Physical     fasting     Gyn Exam     1. Have you been to the ER, urgent care clinic since your last visit? Hospitalized since your last visit? No    2. Have you seen or consulted any other health care providers outside of the [address] [place] [address] since your last visit? Include any pap smears or colon screening. Yes, patient stated she saw an allergist 5/2018, Dr. Estrellita Joe.

## 2018-06-26 NOTE — LETTER
7/2/2018 3:30 PM 
 
Ms. Richards Cooling Nika [address] [facility] [address] Dear [place] Balbuena Cooling: 
 
Please find your most recent results below. Resulted Orders CX-VAG CYTOLOGY Narrative [address]  [facility] 
[address]      [address]         [address] [facility], [address]     [place], [address] [facility] 
[phone] Fax# [phone]    Fax# ([phone]      Fax# [phone] 
 
========================================================================== 
                       * * * CYTOPATHOLOGY REPORT* * *   
========================================================================== 
GYNECOLOGICAL * * *Procedures/Addenda Present * * * Patient:  Yury Morris             Specimen #:  [id number] Age:  1975 (Age: 38)                Date of Procedure: 6/26/2018 Sex:  F                                  Date of Receipt:  6/27/2018 Hospital#:  [id number]               Date of Report: 6/29/2018 Med. Record #:  [id number] Location:  [address] [place] [address]) Physician(s):  Inge Paige MD ([id number]) * * * CLINICAL HISTORY* * * Menstrual History: Postpartum Contraceptive History: Hormonal Therapy, Yes ADDITIONAL PATIENT INFORMATION:  
RFX 12 , 25 / 39 HPV REGARDLESS:  
YES  
SOURCE: 
A: Cervical/Endocervical Imaged Processed Thin Prep 
 
 
============================================================================ 
                                * * * FINAL INTERPRETATION* * * Cervical/Endocervical Imaged Processed Thin Prep Satisfactory for evaluation. Heavy inflammatory exudate present. NEGATIVE FOR INTRAEPITHELIAL LESION OR MALIGNANCY. Otis Villanueva,  CT (AS 
 
 .                                                    
 
 HPV HR Interpretation Test: HPV, high-risk Result: NEGATIVE Reference Interval: Negative This high-risk HPV test detects fourteen high-risk types 
(16/18/31/33/35/39/45/51/52/56/58/59/66/68) without differentiation, using 
nucleic acid amplification method. Test performed by: [address] [place] [address] [place]  Dr. Ashlyn Noonan [address] [facility] Aida Greenerd [facility] Phone number:  [phone] Jayna Beatriz * * *Electronically Signed* * * 
6/29/2018 ICD10 Codes: 
 O81.808 The Pap test is a screening procedure to aid in the detection of cervical 
cancer and its precursors. It should not be used as the sole means of 
detecting cervical cancer. As with any laboratory test, false negative 
and false positive results are known to occur. RECOMMENDATIONS: 
Results as reviewed by Dr. Haim Maher. Negative screen for cervical cancer and negative testing for HPV. Next pap due 5 years. Please call me if you have any questions: [phone] Sincerely, [address] LAB OUTREACH INSURANCE

## 2018-06-26 NOTE — PROGRESS NOTES
[facility] [facility] Note      Subjective:     Chief Complaint   Patient presents with    Complete Physical     fasting     Gyn Exam     Kinga Helm is a 38y.o. year old female who presents for evaluation of the following:      Acute Concerns:  Endorses some headache with her periods  Plans to stop OCP since  just got a vasectomy- in effort to reduce cyclical headaches. Health Maintenance:   Diet: balanced  Activity: recently started bootcamp 3 times per week    TDaP: Due  Influenza: Not due  Pneumovax: Not due   Prevnar @65: Not due  Shingles @60: Not due    Colonoscopy @50: No family history. Not due  ASA @ 55f and 45m: Not due   Dexa @65: Not due    HIV or other STD testing: Declined  Domestic Violence Screen: Negative  Depression Screen: Denies depression or anhedonia        Pap:  Last date unknown. Never abnormal  Mammogram:  No family history. DUE. Patient's last menstrual period was 2018 (exact date). Menses Monthly, lasting 5 days. No recent worsening bleeding or intermenstrual bleeding   reports that she currently engages in sexual activity and has had male partners. She reports using the following method of birth control/protection: Pill. Review of Systems   Pertinent positives and negative per HPI. All other systems  reviewed are negative for a Comprehensive ROS (10+). Past Medical History:   Diagnosis Date    Chronic sinusitis         Social History     Social History    Marital status:      Spouse name: N/A    Number of children: N/A    Years of education: N/A     Occupational History    Not on file.      Social History Main Topics    Smoking status: Never Smoker    Smokeless tobacco: Never Used    Alcohol use No    Drug use: No    Sexual activity: Yes     Partners: Male     Birth control/ protection: Pill     Other Topics Concern    Not on file     Social History Narrative       Current Outpatient Prescriptions Medication Sig    escitalopram oxalate (LEXAPRO) 20 mg tablet TAKE 1 TABLET BY MOUTH DAILY    norgestimate-ethinyl estradiol (ORTHO-CYCLEN, SPRINTEC) 0.25-35 mg-mcg tab Take 1 Tab by mouth daily.  OTHER,NON-FORMULARY, Take  by mouth daily. Indications: Cilest 250/35 mcg tablets     No current facility-administered medications for this visit. Objective:     Vitals:    06/26/18 0942   BP: 120/82   Pulse: 82   Resp: 18   Temp: 98.9 °F (37.2 °C)   TempSrc: Oral   SpO2: 98%   Weight: 192 lb 9.6 oz (87.4 kg)   Height: 5' 9.5\" (1.765 m)       Physical Examination:  General: Alert, cooperative, no distress, appears stated age. Eyes: Conjunctivae clear. PERRL, EOMs intact. Ears: Normal external ear canals both ears. TM clear and mobile bilaterally  Nose: Nares normal. Septum midline. Mucosa normal. No drainage or sinus tenderness. Mouth/Throat: Lips, mucosa, and tongue normal. Teeth and gums normal.  Neck: Supple, symmetrical, trachea midline, no adenopathy. No thyroid enlargement/tenderness/nodules  Back: Symmetric, no curvature. ROM normal. No CVA tenderness. Lungs: Clear to auscultation bilaterally. Normal inspiratory and expiratory ratio. Heart: Regular rate and rhythm, S1, S2 normal, no murmur, click, rub or gallop. Abdomen: Soft, non-tender. Bowel sounds normal. No masses or organomegaly. Extremities: Extremities normal, atraumatic, no cyanosis or edema. Pulses: 2+ and symmetric all extremities. Skin: Skin color, texture, turgor normal. No rashes or lesions on exposed skin. Lymph nodes: Cervical, supraclavicular nodes normal.  Neurologic: CNII-XII intact. Strength 5/5 grossly. Sensation and reflexes normal throughout. No visits with results within 3 Month(s) from this visit.   Latest known visit with results is:    Office Visit on 10/12/2017   Component Date Value Ref Range Status    VALID INTERNAL CONTROL POC 10/12/2017 No   Final    Group A Strep Ag 10/12/2017 Negative  Negative Final Assessment/ Plan:   Diagnoses and all orders for this visit:    1. Well woman exam with routine gynecological exam  -     LIPID PANEL  -     METABOLIC PANEL, COMPREHENSIVE  -     CBC WITH AUTOMATED DIFF  -     HEMOGLOBIN A1C WITH EAG  -     PAP IG, APTIMA HPV AND RFX 16/18,45 ([id number]); Future    2. Overweight (BMI 25.0-29.9)    3. Screening for malignant neoplasm of breast  -     CHASE MAMMO BI SCREENING INCL CAD; Future    Other orders  -     CVD REPORT    Doing well overall. Abnormal findings discussed below. Vaccine reviewed. Mammogram and pap for cancer screening. Labs to evaluate end organ function. STI screen declined    Diet and lifestyle modification encouraged for weight loss and  avoiding weight gain. I have discussed the diagnosis with the patient and the intended plan as seen in the above orders. The patient has received an after-visit summary and questions were answered concerning future plans. I have discussed medication side effects and warnings with the patient as well.            Follow-up Disposition:  Return in about 1 year (around 6/26/2019) for Follow Up, Physical exam.      Signed,    Anurag Eden MD  6/26/2018

## 2018-06-26 NOTE — LETTER
7/2/2018 3:29 PM 
 
Ms. Susie Maher [address] [facility] [address] Dear [employer]ye [employer]: 
 
Please find your most recent results below. Resulted Orders CX-VAG CYTOLOGY Narrative Joyce [address]  [facility]      [address]         [address] [facility]      [place], [address]     [place], [address] [facility] 
[phone] Fax# [phone]    Fax# [phone]      Fax# [phone] 
 
========================================================================== 
                       * * * CYTOPATHOLOGY REPORT* * *   
========================================================================== 
GYNECOLOGICAL * * *Procedures/Addenda Present * * * Patient:  Tamanna Cardoso             Specimen #:  [id number] Age:  1975 (Age: 38)                Date of Procedure: 6/26/2018 Sex:  F                                  Date of Receipt:  6/27/2018 Hospital#:  [id number]               Date of Report: 6/29/2018 Med. Record #:  [id number] Location:  [address] [place] [address]) Physician(s):  Keisha Davis MD ([id number]) * * * CLINICAL HISTORY* * * Menstrual History: Postpartum Contraceptive History: Hormonal Therapy, Yes ADDITIONAL PATIENT INFORMATION:  
RFX 12 , 25 / 39 HPV REGARDLESS:  
YES  
SOURCE: 
A: Cervical/Endocervical Imaged Processed Thin Prep 
 
 
============================================================================ 
                                * * * FINAL INTERPRETATION* * * Cervical/Endocervical Imaged Processed Thin Prep Satisfactory for evaluation. Heavy inflammatory exudate present. NEGATIVE FOR INTRAEPITHELIAL LESION OR MALIGNANCY. Brandon Salinas,  CT (AS 
 
 .                                                    
 
 HPV HR Interpretation Test: HPV, high-risk Result: NEGATIVE Reference Interval: Negative This high-risk HPV test detects fourteen high-risk types 
(16/18/31/33/35/39/45/51/52/56/58/59/66/68) without differentiation, using 
nucleic acid amplification method. Test performed by: [facility] NICHOL Borrero  [facility] [place] [address] Phone number:  [phone] Anastacio Reasongabe * * *Electronically Signed* * * 
6/29/2018 ICD10 Codes: 
 M48.645 The Pap test is a screening procedure to aid in the detection of cervical 
cancer and its precursors. It should not be used as the sole means of 
detecting cervical cancer. As with any laboratory test, false negative 
and false positive results are known to occur. RECOMMENDATIONS: 
 
PAP and HPV  is normal. 
Please call me if you have any questions: [phone] Sincerely, [facility] [employer]REACH INSURANCE

## 2018-06-26 NOTE — PATIENT INSTRUCTIONS

## 2018-06-27 LAB
ALBUMIN SERPL-MCNC: 4.1 G/DL (ref 3.5–5.5)
ALBUMIN/GLOB SERPL: 1.7 {RATIO} (ref 1.2–2.2)
ALP SERPL-CCNC: 53 IU/L (ref 39–117)
ALT SERPL-CCNC: 21 IU/L (ref 0–32)
AST SERPL-CCNC: 20 IU/L (ref 0–40)
BASOPHILS # BLD AUTO: 0 X10E3/UL (ref 0–0.2)
BASOPHILS NFR BLD AUTO: 0 %
BILIRUB SERPL-MCNC: 0.5 MG/DL (ref 0–1.2)
BUN SERPL-MCNC: 12 MG/DL (ref 6–24)
BUN/CREAT SERPL: 19 (ref 9–23)
CALCIUM SERPL-MCNC: 9 MG/DL (ref 8.7–10.2)
CHLORIDE SERPL-SCNC: 104 MMOL/L (ref 96–106)
CHOLEST SERPL-MCNC: 148 MG/DL (ref 100–199)
CO2 SERPL-SCNC: 24 MMOL/L (ref 20–29)
CREAT SERPL-MCNC: 0.62 MG/DL (ref 0.57–1)
EOSINOPHIL # BLD AUTO: 0.1 X10E3/UL (ref 0–0.4)
EOSINOPHIL NFR BLD AUTO: 3 %
ERYTHROCYTE [DISTWIDTH] IN BLOOD BY AUTOMATED COUNT: 12.8 % (ref 12.3–15.4)
EST. AVERAGE GLUCOSE BLD GHB EST-MCNC: 94 MG/DL
GLOBULIN SER CALC-MCNC: 2.4 G/DL (ref 1.5–4.5)
GLUCOSE SERPL-MCNC: 96 MG/DL (ref 65–99)
HBA1C MFR BLD: 4.9 % (ref 4.8–5.6)
HCT VFR BLD AUTO: 39.5 % (ref 34–46.6)
HDLC SERPL-MCNC: 45 MG/DL
HGB BLD-MCNC: 13.2 G/DL (ref 11.1–15.9)
IMM GRANULOCYTES # BLD: 0 X10E3/UL (ref 0–0.1)
IMM GRANULOCYTES NFR BLD: 0 %
INTERPRETATION, 910389: NORMAL
LDLC SERPL CALC-MCNC: 90 MG/DL (ref 0–99)
LYMPHOCYTES # BLD AUTO: 1.4 X10E3/UL (ref 0.7–3.1)
LYMPHOCYTES NFR BLD AUTO: 28 %
MCH RBC QN AUTO: 29.9 PG (ref 26.6–33)
MCHC RBC AUTO-ENTMCNC: 33.4 G/DL (ref 31.5–35.7)
MCV RBC AUTO: 89 FL (ref 79–97)
MONOCYTES # BLD AUTO: 0.3 X10E3/UL (ref 0.1–0.9)
MONOCYTES NFR BLD AUTO: 5 %
NEUTROPHILS # BLD AUTO: 3.3 X10E3/UL (ref 1.4–7)
NEUTROPHILS NFR BLD AUTO: 64 %
PLATELET # BLD AUTO: 177 X10E3/UL (ref 150–379)
POTASSIUM SERPL-SCNC: 4.3 MMOL/L (ref 3.5–5.2)
PROT SERPL-MCNC: 6.5 G/DL (ref 6–8.5)
RBC # BLD AUTO: 4.42 X10E6/UL (ref 3.77–5.28)
SODIUM SERPL-SCNC: 141 MMOL/L (ref 134–144)
TRIGL SERPL-MCNC: 63 MG/DL (ref 0–149)
VLDLC SERPL CALC-MCNC: 13 MG/DL (ref 5–40)
WBC # BLD AUTO: 5.1 X10E3/UL (ref 3.4–10.8)

## 2018-07-02 NOTE — PROGRESS NOTES
Patient sent letter of normal PAP and HPV screening as reviewed by Dr. Salvatore Barros with instructions. Information sent about  HPV.

## 2018-07-02 NOTE — ADDENDUM NOTE
Encounter addended by: Zuleyma Abbott LPN on: 0/8/8813  1:43 PM<BR>     Actions taken: Letter status changed

## 2018-07-02 NOTE — PROGRESS NOTES
Notify patient:   Negative screen for cervical cancer and negative testing for HPV. Next pap due 5 years.

## 2018-07-12 ENCOUNTER — HOSPITAL ENCOUNTER (OUTPATIENT)
Dept: MAMMOGRAPHY | Age: 43
Discharge: HOME | End: 2018-07-12
Payer: COMMERCIAL

## 2018-07-12 DIAGNOSIS — Z12.31 VISIT FOR SCREENING MAMMOGRAM: ICD-10-CM

## 2018-07-12 PROCEDURE — 77063 BREAST TOMOSYNTHESIS BI: CPT

## 2018-07-29 DIAGNOSIS — Z30.41 ENCOUNTER FOR BIRTH CONTROL PILLS MAINTENANCE: ICD-10-CM

## 2018-07-31 RX ORDER — NORGESTIMATE AND ETHINYL ESTRADIOL 0.25-0.035
KIT ORAL
Qty: 1 PACKAGE | Refills: 0 | Status: SHIPPED | OUTPATIENT
Start: 2018-07-31 | End: 2019-01-28 | Stop reason: ALTCHOICE

## 2018-09-14 DIAGNOSIS — F41.1 GAD (GENERALIZED ANXIETY DISORDER): ICD-10-CM

## 2018-09-14 RX ORDER — ESCITALOPRAM OXALATE 20 MG/1
TABLET ORAL
Qty: 30 TAB | Refills: 0 | Status: SHIPPED | OUTPATIENT
Start: 2018-09-14 | End: 2018-10-13 | Stop reason: SDUPTHER

## 2018-10-13 DIAGNOSIS — F41.1 GAD (GENERALIZED ANXIETY DISORDER): ICD-10-CM

## 2018-10-14 RX ORDER — ESCITALOPRAM OXALATE 20 MG/1
TABLET ORAL
Qty: 30 TAB | Refills: 0 | Status: SHIPPED | OUTPATIENT
Start: 2018-10-14 | End: 2018-11-16 | Stop reason: SDUPTHER

## 2018-11-16 DIAGNOSIS — F41.1 GAD (GENERALIZED ANXIETY DISORDER): ICD-10-CM

## 2018-11-16 RX ORDER — ESCITALOPRAM OXALATE 20 MG/1
TABLET ORAL
Qty: 30 TAB | Refills: 0 | Status: SHIPPED | OUTPATIENT
Start: 2018-11-16 | End: 2019-02-11 | Stop reason: SDUPTHER

## 2019-01-12 DIAGNOSIS — F41.1 GAD (GENERALIZED ANXIETY DISORDER): ICD-10-CM

## 2019-01-14 RX ORDER — ESCITALOPRAM OXALATE 20 MG/1
TABLET ORAL
Qty: 30 TAB | Refills: 0 | OUTPATIENT
Start: 2019-01-14

## 2019-01-18 ENCOUNTER — TELEPHONE (OUTPATIENT)
Dept: FAMILY MEDICINE CLINIC | Age: 44
End: 2019-01-18

## 2019-01-18 DIAGNOSIS — F41.1 GAD (GENERALIZED ANXIETY DISORDER): ICD-10-CM

## 2019-01-18 RX ORDER — ESCITALOPRAM OXALATE 20 MG/1
20 TABLET ORAL DAILY
Qty: 30 TAB | Refills: 0 | Status: SHIPPED | OUTPATIENT
Start: 2019-01-18 | End: 2019-01-28 | Stop reason: SDUPTHER

## 2019-01-18 NOTE — TELEPHONE ENCOUNTER
----- Message from Earline Jianghew sent at 1/17/2019  8:51 PM EST -----  Regarding: NP Brittany/Telephone  Pt is reporting that her Rx for \"Escitalopram 20 mg\" was denied by [address] [place] [address] for requiring a prior authorization. Best contact [phone]  .   Requested Prescriptions     Refused Prescriptions Disp Refills    escitalopram oxalate (LEXAPRO) 20 mg tablet [Pharmacy Med Name: ESCITALOPRAM 20MG TABLETS] 30 Tab 0     Sig: TAKE ONE TABLET BY MOUTH EVERY DAY; APPOINTMENT DUE     Refused By: Leigha Love     Reason for Refusal: Appt required, please call patient

## 2019-01-18 NOTE — TELEPHONE ENCOUNTER
Aniyah Bernabe transferred a call on behalf of this patient requested to speak with the Supervisor. Her friend Viridiana Muniz  stated that Ms. Clifford was having a rough time and was in need of her medication, she was having a hard time with a daughter, and she was just trying to help her out etc. She said the patient had called earlier in the week and also called the pharmacy but they told her she needed to call her pcp. The patient was told she had not been here and needed to be seen. An appointment was scheduled at the end of the month. She is asking if we can call the pharmacy and at least fill in the prescription for at least a few days until she can see TALYA Soto. ( Medication is Escitalopram oxalate)    Bobbi Villa has already sent in a message prior to this. I told the patient it was up to the doctor and she would have to wait for her response. Please notify the patient at [phone].  Thanks

## 2019-01-18 NOTE — TELEPHONE ENCOUNTER
Pt is calling in regards to having medication submitted to pharmacy for refill kerry if its enough that will cover her until her scheduled .  Pt is scheduled to be seen in office on  Monday, January 28, 2019 02:15 PM    Requested Prescriptions     Pending Prescriptions Disp Refills    escitalopram oxalate (LEXAPRO) 20 mg tablet 30 Tab 0     Refused Prescriptions Disp Refills    escitalopram oxalate (LEXAPRO) 20 mg tablet [Pharmacy Med Name: ESCITALOPRAM 20MG TABLETS] 30 Tab 0     Sig: TAKE ONE TABLET BY MOUTH EVERY DAY; APPOINTMENT DUE     Refused By: Lida Castillo     Reason for Refusal: Appt required, please call patient         Best call back number  [phone]    Pharmacy on file verified

## 2019-01-20 RX ORDER — ESCITALOPRAM OXALATE 20 MG/1
TABLET ORAL
Qty: 30 TAB | Refills: 0 | OUTPATIENT
Start: 2019-01-20

## 2019-01-28 ENCOUNTER — OFFICE VISIT (OUTPATIENT)
Dept: FAMILY MEDICINE CLINIC | Age: 44
End: 2019-01-28

## 2019-01-28 VITALS
OXYGEN SATURATION: 96 % | SYSTOLIC BLOOD PRESSURE: 132 MMHG | HEIGHT: 69 IN | HEART RATE: 80 BPM | TEMPERATURE: 97.8 F | WEIGHT: 203 LBS | RESPIRATION RATE: 18 BRPM | DIASTOLIC BLOOD PRESSURE: 86 MMHG | BODY MASS INDEX: 30.07 KG/M2

## 2019-01-28 DIAGNOSIS — Z23 ENCOUNTER FOR IMMUNIZATION: ICD-10-CM

## 2019-01-28 DIAGNOSIS — F41.1 GAD (GENERALIZED ANXIETY DISORDER): Primary | ICD-10-CM

## 2019-01-28 NOTE — PROGRESS NOTES
HISTORY OF PRESENT ILLNESS Addis Rudolph is a 41 y.o. female. HPI Overdue follow up ALFREDO. Taking prescribed lexapro with adequate sx control. Recently had mild exacerbation when daughter was diagnosed with anxiety disorder. Sx now improved. Tolerating medication without adverse side effects. Patient Active Problem List  
Diagnosis Code  ALFREDO (generalized anxiety disorder) F41.1 Current Outpatient Medications Medication Sig  escitalopram oxalate (LEXAPRO) 20 mg tablet TAKE 1 TABLET BY MOUTH EVERY DAY. DUE FOR APPOINTMENT. No current facility-administered medications for this visit. Social History Tobacco Use  Smoking status: Never Smoker  Smokeless tobacco: Never Used Substance Use Topics  Alcohol use: No  
 Drug use: No  
 
Visit Vitals /86 (BP 1 Location: Right arm, BP Patient Position: Sitting) Pulse 80 Temp 97.8 °F (36.6 °C) (Oral) Resp 18 Ht 5' 9\" (1.753 m) Wt 203 lb (92.1 kg) SpO2 96% BMI 29.98 kg/m² Review of Systems Constitutional: Negative. Negative for weight loss. Respiratory: Negative for cough and shortness of breath. Cardiovascular: Negative for chest pain and palpitations. Psychiatric/Behavioral: Negative for depression, hallucinations, substance abuse and suicidal ideas. The patient is nervous/anxious. The patient does not have insomnia. All other systems reviewed and are negative. Physical Exam  
Constitutional: No distress. Neck: Neck supple. Cardiovascular: Normal rate, regular rhythm and normal heart sounds. Pulmonary/Chest: Effort normal and breath sounds normal.  
Lymphadenopathy:  
  She has no cervical adenopathy. Skin: Skin is warm and dry. Psychiatric: She has a normal mood and affect. Her behavior is normal. Thought content normal.  
 
 
ASSESSMENT and PLAN Diagnoses and all orders for this visit: 1. ALFREDO (generalized anxiety disorder) Stable on lexapro. Stress reduction and relaxation techniques reviewed. 2. Encounter for immunization 
-     INFLUENZA VIRUS VAC QUAD,SPLIT,PRESV FREE SYRINGE IM 3. BMI 29.0-29.9,adult Reviewed healthy diet and exercise recommendations. Follow up 6 months or sooner prn. Recommend schedule CPE.

## 2019-01-28 NOTE — PROGRESS NOTES
Health Maintenance Due Topic Date Due  Influenza Age 5 to Adult  08/01/2018 Chief Complaint Patient presents with  Medication Refill 1. Have you been to the ER, urgent care clinic since your last visit? Hospitalized since your last visit? No 
 
2. Have you seen or consulted any other health care providers outside of the [address] [place] [address] since your last visit? Include any pap smears or colon screening. No 
 
3) Do you have an Advance Directive on file? no 
 
4) Are you interested in receiving information on Advance Directives? NO Patient is accompanied by self I have received verbal consent from [facility] to discuss any/all medical information while they are present in the room.

## 2019-03-25 ENCOUNTER — TELEPHONE (OUTPATIENT)
Dept: FAMILY MEDICINE CLINIC | Age: 44
End: 2019-03-25

## 2019-03-25 NOTE — TELEPHONE ENCOUNTER
PT is calling to request a copy or a letter showing that she received her Flu shot. She is attending a medical event and needs to show proof.

## 2019-10-03 ENCOUNTER — HOSPITAL ENCOUNTER (OUTPATIENT)
Dept: MAMMOGRAPHY | Age: 44
Discharge: HOME | End: 2019-10-03
Payer: COMMERCIAL

## 2019-10-03 DIAGNOSIS — Z12.31 VISIT FOR SCREENING MAMMOGRAM: ICD-10-CM

## 2019-10-03 PROCEDURE — 77063 BREAST TOMOSYNTHESIS BI: CPT

## 2019-11-15 DIAGNOSIS — F41.1 GAD (GENERALIZED ANXIETY DISORDER): ICD-10-CM

## 2019-11-15 RX ORDER — ESCITALOPRAM OXALATE 20 MG/1
TABLET ORAL
Qty: 30 TAB | Refills: 0 | Status: SHIPPED | OUTPATIENT
Start: 2019-11-15 | End: 2019-12-18 | Stop reason: SDUPTHER

## 2019-12-18 DIAGNOSIS — F41.1 GAD (GENERALIZED ANXIETY DISORDER): ICD-10-CM

## 2019-12-18 RX ORDER — ESCITALOPRAM OXALATE 20 MG/1
TABLET ORAL
Qty: 30 TAB | Refills: 0 | Status: SHIPPED | OUTPATIENT
Start: 2019-12-18 | End: 2020-01-17

## 2019-12-18 NOTE — TELEPHONE ENCOUNTER
Pharm on file verified. They are requesting a refill on the following meds.      Requested Prescriptions     Pending Prescriptions Disp Refills    escitalopram oxalate (LEXAPRO) 20 mg tablet 30 Tab 0

## 2019-12-18 NOTE — TELEPHONE ENCOUNTER
PCP: Lainey Pagan NP    Last appt: 1/28/2019  No future appointments.     Requested Prescriptions     Pending Prescriptions Disp Refills    escitalopram oxalate (LEXAPRO) 20 mg tablet 30 Tab 0   appt 12/30/19

## 2019-12-30 ENCOUNTER — OFFICE VISIT (OUTPATIENT)
Dept: FAMILY MEDICINE CLINIC | Age: 44
End: 2019-12-30

## 2019-12-30 VITALS
SYSTOLIC BLOOD PRESSURE: 114 MMHG | BODY MASS INDEX: 32.58 KG/M2 | DIASTOLIC BLOOD PRESSURE: 72 MMHG | WEIGHT: 220 LBS | RESPIRATION RATE: 16 BRPM | HEART RATE: 72 BPM | TEMPERATURE: 98.4 F | HEIGHT: 69 IN | OXYGEN SATURATION: 97 %

## 2019-12-30 DIAGNOSIS — F41.1 GAD (GENERALIZED ANXIETY DISORDER): Primary | ICD-10-CM

## 2019-12-30 DIAGNOSIS — R53.83 FATIGUE, UNSPECIFIED TYPE: ICD-10-CM

## 2019-12-30 DIAGNOSIS — Z23 ENCOUNTER FOR IMMUNIZATION: ICD-10-CM

## 2019-12-30 NOTE — PROGRESS NOTES
Chief Complaint   Patient presents with    Medication Refill     lexapro. patient inquired about increased     Immunization/Injection     Influenza      1. Have you been to the ER, urgent care clinic since your last visit? Hospitalized since your last visit? No    2. Have you seen or consulted any other health care providers outside of the [address] [place] [address] since your last visit? Include any pap smears or colon screening. Enedina Clifford is a 45 y.o. female who presents for routine Influenza  immunizations. She denies any symptoms , reactions or allergies that would exclude them from being immunized today. Risks and adverse reactions were discussed and the VIS was given to them. All questions were addressed. She was observed for 10 min post injection. There were no reactions observed.     Amanda Henderson LPN

## 2019-12-30 NOTE — PROGRESS NOTES
HISTORY OF PRESENT ILLNESS  Pennelope Sacks is a 45 y.o. female. HPI  Follow up ALFREDO. Has been taking lexapro for several years with adequate sx control. Reports feeling more anxious over past few weeks. C/o excessive worrying. Feeling distracted at work. Denies any difficulty sleeping at night. Reports lexapro dose was increased in the past and she did not tolerate higher dose. Works [employer] and has 2 children ages 6 and 9. Also feeling more fatigued. History of JOVANNI in the past.  No history of thyroid problems. Past Medical History:   Diagnosis Date    Chronic sinusitis      Patient Active Problem List   Diagnosis Code    ALFREDO (generalized anxiety disorder) F41.1     Current Outpatient Medications   Medication Sig    escitalopram oxalate (LEXAPRO) 20 mg tablet TAKE 1 TABLET BY MOUTH EVERY DAY*NEED TO MAKE APPT FOR MORE REFILLS*     No current facility-administered medications for this visit. Social History     Tobacco Use    Smoking status: Never Smoker    Smokeless tobacco: Never Used   Substance Use Topics    Alcohol use: No    Drug use: No     Blood pressure 114/72, pulse 72, temperature 98.4 °F (36.9 °C), temperature source Oral, resp. rate 16, height 5' 9\" (1.753 m), weight 220 lb (99.8 kg), last menstrual period 12/27/2019, SpO2 97 %. Review of Systems   Constitutional: Positive for malaise/fatigue (fatigue). Respiratory: Negative for cough and shortness of breath. Cardiovascular: Positive for palpitations (rare). Negative for chest pain and leg swelling. Psychiatric/Behavioral: Negative for depression, substance abuse and suicidal ideas. The patient is nervous/anxious. The patient does not have insomnia. All other systems reviewed and are negative. Physical Exam  Constitutional:       General: She is not in acute distress. Neck:      Musculoskeletal: Neck supple. Comments: Negative thyromegaly. Cardiovascular:      Rate and Rhythm: Normal rate and regular rhythm. Heart sounds: Normal heart sounds. Pulmonary:      Effort: Pulmonary effort is normal.      Breath sounds: Normal breath sounds. Abdominal:      General: There is no distension. Palpations: Abdomen is soft. Tenderness: There is no tenderness. Lymphadenopathy:      Cervical: No cervical adenopathy. Skin:     General: Skin is warm and dry. Neurological:      Mental Status: Mental status is at baseline. Psychiatric:         Mood and Affect: Mood normal.         Behavior: Behavior normal.         Thought Content: Thought content normal.         ASSESSMENT and PLAN  Diagnoses and all orders for this visit:    1. ALFREDO (generalized anxiety disorder)  Sub optimal control on lexapro. Does not tolerate increased dose. Consider adding low dose wellbutrin if labs normal and symptoms persist.    2. Fatigue, unspecified type  -     CBC W/O DIFF  -     FERRITIN  -     TSH 3RD GENERATION  -     MO HANDLG&/OR CONVEY OF SPEC FOR TR OFFICE TO LAB  -     COLLECTION VENOUS BLOOD,VENIPUNCTURE    3. Encounter for immunization  -     INFLUENZA VIRUS VAC QUAD,SPLIT,PRESV FREE SYRINGE IM  -     MO IMMUNIZ ADMIN,1 SINGLE/COMB VAC/TOXOID    4. BMI 32.0-32.9,adult  Reviewed healthy diet and exercise recommendations. Follow up pending test results.

## 2019-12-30 NOTE — PATIENT INSTRUCTIONS
Vaccine Information Statement    Influenza (Flu) Vaccine (Live, Intranasal): What You Need to Know    Many Vaccine Information Statements are available in Occitan and other languages. See www.immunize.org/vis  Hojas de información sobre vacunas están disponibles en español y en muchos otros idiomas. Visite www.immunize.org/vis    1. Why get vaccinated? Influenza vaccine can prevent influenza (flu). Flu is a contagious disease that spreads around the [facility] every year, usually between October and May. Anyone can get the flu, but it is more dangerous for some people. Infants and young children, people 72years of age and older, pregnant women, and people with certain health conditions or a weakened immune system are at greatest risk of flu complications. Pneumonia, bronchitis, sinus infections and ear infections are examples of flu-related complications. If you have a medical condition, such as heart disease, cancer or diabetes, flu can make it worse. Flu can cause fever and chills, sore throat, muscle aches, fatigue, cough, headache, and runny or stuffy nose. Some people may have vomiting and diarrhea, though this is more common in children than adults. Each year thousands of people in the [facility] die from flu, and many more are hospitalized. Flu vaccine prevents millions of illnesses and flu-related visits to the doctor each year. 2. Live, attenuated influenza vaccine     CDC recommends everyone 10months of age and older get vaccinated every flu season. Children 6 months through 6years of age may need 2 doses during a single flu season. Everyone else needs only 1 dose each flu season. Live, attenuated influenza vaccine (called LAIV) is a nasal spray vaccine that may be given to non-pregnant people 2 through 52years of age. It takes about 2 weeks for protection to develop after vaccination. There are many flu viruses, and they are always changing.  Each year a new flu vaccine is made to protect against three or four viruses that are likely to cause disease in the upcoming flu season. Even when the vaccine doesnt exactly match these viruses, it may still provide some protection. Influenza vaccine does not cause flu. Influenza vaccine may be given at the same time as other vaccines. 3. Talk with your health care provider    Tell your vaccine provider if the person getting the vaccine:   Is younger than 2 years or older than 52years of age.  Is pregnant.  Has had an allergic reaction after a previous dose of influenza vaccine, or has any severe, life-threatening allergies.  Is a child or adolescent 2 through 16years of age who is receiving aspirin or aspirin-containing products.  Has a weakened immune system.  Is a child 3through 3years old who has asthma or a history of wheezing in the past 12 months.  Has taken influenza antiviral medication in the previous 48 hours.  Cares for severely immunocompromised persons who require a protected environment.  Is 5 years or older and has asthma.  Has other underlying medical conditions that can put people at higher risk of serious flu complications (such as lung disease, heart disease, kidney disease, kidney or liver disorders, neurologic or neuromuscular or metabolic disorders).  Has had Guillain-Barré Syndrome within 6 weeks after a previous dose of influenza vaccine. In some cases, your health care provider may decide to postpone influenza vaccination to a future visit. For some patients, a different type of influenza vaccine (inactivated or recombinant influenza vaccine) might be more appropriate than live, attenuated influenza vaccine. People with minor illnesses, such as a cold, may be vaccinated. People who are moderately or severely ill should usually wait until they recover before getting influenza vaccine. Your health care provider can give you more information.     4. Risks of a vaccine reaction     Runny nose or nasal congestion, wheezing and headache can happen after LAIV.  Vomiting, muscle aches, fever, sore throat and cough are other possible side effects. If these problems occur, they usually begin soon after vaccination and are mild and short-lived. As with any medicine, there is a very remote chance of a vaccine causing a severe allergic reaction, other serious injury, or death. 5. What if there is a serious problem? An allergic reaction could occur after the vaccinated person leaves the clinic. If you see signs of a severe allergic reaction (hives, swelling of the face and throat, difficulty breathing, a fast heartbeat, dizziness, or weakness), call 9-1-1 and get the person to the nearest hospital.    For other signs that concern you, call your health care provider. Adverse reactions should be reported to the Vaccine Adverse Event Reporting System (VAERS). Your health care provider will usually file this report, or you can do it yourself. Visit the VAERS website at www.vaers. hhs.gov or call 7-225.914.6489. VAERS is only for reporting reactions, and VAERS staff do not give medical advice. 6. The National Vaccine Injury Compensation Program    The [employer] [place] Vaccine Injury Compensation Program (VICP) is a federal program that was created to compensate people who may have been injured by certain vaccines. Visit the VICP website at www.hrsa.gov/vaccinecompensation or call 0-386.811.3212 to learn about the program and about filing a claim. There is a time limit to file a claim for compensation. 7. How can I learn more?  Ask your health care provider.  Call your local or state health department.  Contact the Centers for Disease Control and Prevention (CDC):  - Call 2-680.640.5984 (1-800-CDC-INFO) or  - Visit CDCs influenza website at www.cdc.gov/flu    Vaccine Information Statement (Interim)  Live Attenuated Influenza Vaccine   8/15/2019  42 FEROZ Lawrence [id number]   Department of Health and Human Services  Centers for Disease Control and Prevention    Office Use Only

## 2019-12-31 LAB
ERYTHROCYTE [DISTWIDTH] IN BLOOD BY AUTOMATED COUNT: 11.7 % (ref 12.3–15.4)
FERRITIN SERPL-MCNC: 52 NG/ML (ref 15–150)
HCT VFR BLD AUTO: 38.7 % (ref 34–46.6)
HGB BLD-MCNC: 13.1 G/DL (ref 11.1–15.9)
MCH RBC QN AUTO: 30.2 PG (ref 26.6–33)
MCHC RBC AUTO-ENTMCNC: 33.9 G/DL (ref 31.5–35.7)
MCV RBC AUTO: 89 FL (ref 79–97)
PLATELET # BLD AUTO: 173 X10E3/UL (ref 150–450)
RBC # BLD AUTO: 4.34 X10E6/UL (ref 3.77–5.28)
TSH SERPL DL<=0.005 MIU/L-ACNC: 2.43 UIU/ML (ref 0.45–4.5)
WBC # BLD AUTO: 5.1 X10E3/UL (ref 3.4–10.8)

## 2020-01-05 ENCOUNTER — HOSPITAL ENCOUNTER (EMERGENCY)
Age: 45
Discharge: HOME | End: 2020-01-05
Attending: STUDENT IN AN ORGANIZED HEALTH CARE EDUCATION/TRAINING PROGRAM
Payer: COMMERCIAL

## 2020-01-05 VITALS
HEIGHT: 69 IN | WEIGHT: 220.46 LBS | TEMPERATURE: 98.3 F | SYSTOLIC BLOOD PRESSURE: 130 MMHG | DIASTOLIC BLOOD PRESSURE: 93 MMHG | OXYGEN SATURATION: 100 % | RESPIRATION RATE: 16 BRPM | HEART RATE: 75 BPM | BODY MASS INDEX: 32.65 KG/M2

## 2020-01-05 DIAGNOSIS — S06.0X0A CONCUSSION WITHOUT LOSS OF CONSCIOUSNESS, INITIAL ENCOUNTER: Primary | ICD-10-CM

## 2020-01-05 PROCEDURE — 99282 EMERGENCY DEPT VISIT SF MDM: CPT
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
## 2020-01-05 NOTE — DISCHARGE INSTRUCTIONS
Patient Education        Returning to Activity After a Childhood Concussion: Care Instructions  Your Care Instructions    A concussion is a kind of injury to the brain. It happens when the head or body receives a hard blow. The impact can jar or shake the brain against the skull. This interrupts the brain's normal activities. Any child who has had a concussion at a sports event needs to stop all activity and not return to play. Being active again before the brain recovers can raise your child's risk of having a more serious brain injury. Your doctor will decide when your child can go back to activity or sports. In general, children should not return to play until they have no symptoms, are back at school, and are no longer taking medicines for the concussion. The risk of a second concussion is greatest within 10 days of the first one. Follow-up care is a key part of your child's treatment and safety. Be sure to make and go to all appointments, and call your doctor if your child is having problems. It's also a good idea to know your child's test results and keep a list of the medicines your child takes. How can you care for your child at home? At home  · Help your child rest his or her body and brain. Most experts agree that children should rest for 1 to 2 days. Let your child know that rest--even though it can be hard--can speed up recovery. ? Pay close attention to symptoms as your child slowly returns to his or her regular routine. Avoid anything that makes symptoms worse or causes new ones. ? Make sure your child gets plenty of sleep. It may help to keep your child's room quiet, dark or dimly lit, and cool. Have your child go to bed and get up at the same time, and limit foods and drinks with caffeine. ? Limit housework, homework, and screen time. ? Avoid activities that could lead to another head injury. ? Follow your doctor's instructions for a gradual return to activity and sports.   Back to school  · Wait until your child can focus for 30 to 45 minutes at a time before you send your child back to school. · Tell teachers, administrators, school counselors, and nurses what symptoms your child has or could develop. Sign a release form so the school can coordinate care with your child's doctor. · Arrange for any special changes your child needs. For example, depending on symptoms, your child may need to:  ? Start back to school with shorter days. ? Take 15-minute breaks after every 30 minutes of classwork.  ? Have more time for assignments, postpone tests, or have another student take notes. ? Avoid bright lights. (You can suggest dimmed lighting or that your child wear sunglasses.)  ? Avoid noisy places, like the gym or cafeteria. · Check in with school staff often. Discuss how your child is doing, academically and emotionally. A concussion can make kids grouchy and emotional. And needing extra help or extra rest can be hard for some kids. · If your child doesn't recover within 3 to 4 weeks, talk with your doctor and the school staff. They may recommend a 504 plan. It's a plan for kids who need ongoing adjustments at school. Returning to play  · Follow the steps that doctors and concussion specialists suggest for returning to sports after a concussion. Use these steps below as a guide. In most places, your doctor must give you written permission for your child to begin the steps and return to sports. Your child should slowly progress through the following levels of activity:  ? Limited activity. Your child can take part in daily activities as long as the activity doesn't increase his or her symptoms or cause new symptoms. ? Light aerobic activity. This can include walking, swimming, or other exercise at less than 70% of your child's maximum heart rate. No resistance training is included in this step. ? Sport-specific exercise.  This includes running drills or skating drills (depending on the sport), but no head impact. ? Noncontact training drills. This includes more complex training drills such as passing. Your child may also begin light resistance training. ? Full-contact practice. Your child can take part in normal training. ? Return to normal game play. This is the final step and allows your child to join in normal game play. · Watch and keep track of your child's progress. It should take at least 6 days for your child to go from light activity to normal game play. · Make sure that your child can stay at each new level of activity for at least 24 hours without symptoms, or as long as your doctor says, before doing more. · If one or more symptoms come back, have your child return to a lower level of activity for at least 24 hours. He or she should not move on until all symptoms are gone. When should you call for help? Call 911 anytime you think your child may need emergency care. For example, call if:    · Your child has a seizure.     · Your child passes out (loses consciousness).     · Your child is confused or hard to wake up.   [facility] your doctor now or seek immediate medical care if:    · Your child has new or worse vomiting.     · Your child seems less alert.     · Your child has new weakness or numbness in any part of the body.    Watch closely for changes in your child's health, and be sure to contact your doctor if:    · Your child does not get better as expected.     · Your child has new symptoms, such as headaches, trouble concentrating, or changes in mood. Where can you learn more? Go to http://ignacio-karolina.info/. Enter M970 in the search box to learn more about \"Returning to Activity After a Childhood Concussion: Care Instructions. \"  Current as of: March 28, 2019  Content Version: 12.2  © 2284-3569 [employer]. Care instructions adapted under license by [employer] (which disclaims liability or warranty for this information).  If you have questions about a medical condition or this instruction, always ask your healthcare professional. [place] [address] any warranty or liability for your use of this information.

## 2020-01-05 NOTE — ED NOTES
Patient provided with discharge instructions and verbalized understanding;Patient ambulatory out of department with steady gait.

## 2020-01-07 ENCOUNTER — TELEPHONE (OUTPATIENT)
Dept: FAMILY MEDICINE CLINIC | Age: 45
End: 2020-01-07

## 2020-01-07 DIAGNOSIS — F41.1 GAD (GENERALIZED ANXIETY DISORDER): Primary | ICD-10-CM

## 2020-01-07 RX ORDER — BUPROPION HYDROCHLORIDE 75 MG/1
75 TABLET ORAL DAILY
Qty: 30 TAB | Refills: 0 | Status: SHIPPED | OUTPATIENT
Start: 2020-01-07 | End: 2020-02-06

## 2020-01-07 NOTE — ED PROVIDER NOTES
The history is provided by the patient. Head Injury    The incident occurred 3 to 5 hours ago. She came to the ER via walk-in (as referred by [facility]). The injury mechanism was playing sports. The volume of blood lost was none. The quality of the pain is described as dull. The pain is at a severity of 3/10. The pain is mild. The pain has been improving since the injury. Associated symptoms include disorientation. Pertinent negatives include no numbness, no vomiting, no weakness and no memory loss. She has tried rest for the symptoms. The treatment provided mild relief. There was no loss of consciousness. She has been behaving normally.         Past Medical History:   Diagnosis Date    Chronic sinusitis        Past Surgical History:   Procedure Laterality Date    HX GYN      HX HEENT  11/2016    sinus surgery         Family History:   Problem Relation Age of Onset    No Known Problems Mother     No Known Problems Father        Social History     Socioeconomic History    Marital status:      Spouse name: Not on file    Number of children: Not on file    Years of education: Not on file    Highest education level: Not on file   Occupational History    Not on file   Social Needs    Financial resource strain: Not on file    Food insecurity:     Worry: Not on file     Inability: Not on file    Transportation needs:     Medical: Not on file     Non-medical: Not on file   Tobacco Use    Smoking status: Never Smoker    Smokeless tobacco: Never Used   Substance and Sexual Activity    Alcohol use: No    Drug use: No    Sexual activity: Yes     Partners: Male     Birth control/protection: Pill   Lifestyle    Physical activity:     Days per week: Not on file     Minutes per session: Not on file    Stress: Not on file   Relationships    Social connections:     Talks on phone: Not on file     Gets together: Not on file     Attends Mormonism service: Not on file     Active member of club or organization: Not on file     Attends meetings of clubs or organizations: Not on file     Relationship status: Not on file    Intimate partner violence:     Fear of current or ex partner: Not on file     Emotionally abused: Not on file     Physically abused: Not on file     Forced sexual activity: Not on file   Other Topics Concern    Not on file   Social History Narrative    Not on file         ALLERGIES: Patient has no known allergies. Review of Systems   HENT: Negative for dental problem and facial swelling. Cardiovascular: Negative for chest pain. Gastrointestinal: Negative for abdominal pain and vomiting. Musculoskeletal: Negative for back pain and neck pain. Skin: Negative for wound. Neurological: Positive for headaches. Negative for syncope, speech difficulty, weakness and numbness. Psychiatric/Behavioral: Negative for memory loss. Vitals:    01/05/20 1751   BP: (!) 130/93   Pulse: 75   Resp: 16   Temp: 98.3 °F (36.8 °C)   SpO2: 100%   Weight: 100 kg (220 lb 7.4 oz)   Height: 5' 9\" (1.753 m)            Physical Exam  Constitutional:       General: She is not in acute distress. Appearance: She is well-developed. HENT:      Head: Normocephalic and atraumatic. Right Ear: External ear normal.      Left Ear: External ear normal.      Nose: Nose normal.      Mouth/Throat:      Mouth: Mucous membranes are moist.      Pharynx: Oropharynx is clear. Eyes:      Extraocular Movements: Extraocular movements intact. Conjunctiva/sclera: Conjunctivae normal.      Pupils: Pupils are equal, round, and reactive to light. Neck:      Musculoskeletal: Normal range of motion and neck supple. Cardiovascular:      Rate and Rhythm: Normal rate and regular rhythm. Heart sounds: Normal heart sounds. Pulmonary:      Effort: Pulmonary effort is normal. No respiratory distress. Breath sounds: Normal breath sounds. Abdominal:      Palpations: Abdomen is soft.       Tenderness: There is no tenderness. There is no guarding. Musculoskeletal: Normal range of motion. General: No signs of injury. Skin:     General: Skin is warm and dry. Neurological:      Mental Status: She is alert and oriented to person, place, and time. GCS: GCS eye subscore is 4. GCS verbal subscore is 5. GCS motor subscore is 6. Cranial Nerves: Cranial nerves are intact. Sensory: Sensation is intact. Motor: Motor function is intact. Gait: Gait is intact. MDM       Procedures      The patient presented with a complaint of a fall or minor trauma. The patient is now resting comfortably and feels better, is alert and in no distress. The patient has a normal mental status and is neurologically intact. The history, exam, diagnostic testing (if any) and current condition do not demonstrate signs of clinically significant intra-cranial, intra-thoracic, intra-abdominal, or musculoskeletal trauma. The vital signs have been stable. [place] CT head and C-spine rules are negative. No indication for emergent neuroimaging. The patient's condition is stable and appropriate for discharge. The patient will pursue further outpatient evaluation with the primary care physician or other designated or consulting physician as indicated in the discharge instructions.

## 2020-01-07 NOTE — TELEPHONE ENCOUNTER
----- Message from More Leiva sent at 1/7/2020  2:17 PM EST -----  Regarding: KIRSTY Bland [facility]  Contact: [phone]  Reason for call:  Blood work results  Callback required yes/no and why:  Yes  Best contact number(s) [phone]  Details to clarify the request:  Pt is requesting to have her nurse provide her blood work results.

## 2020-01-07 NOTE — TELEPHONE ENCOUNTER
Informed of results.  [facility] told her if labs were normal she will rx another med aling with lexapro

## 2020-02-06 DIAGNOSIS — F41.1 GAD (GENERALIZED ANXIETY DISORDER): ICD-10-CM

## 2020-02-06 RX ORDER — BUPROPION HYDROCHLORIDE 75 MG/1
TABLET ORAL
Qty: 30 TAB | Refills: 0 | Status: SHIPPED | OUTPATIENT
Start: 2020-02-06 | End: 2020-03-04

## 2020-02-10 ENCOUNTER — OFFICE VISIT (OUTPATIENT)
Dept: FAMILY MEDICINE CLINIC | Age: 45
End: 2020-02-10

## 2020-02-10 VITALS
HEART RATE: 77 BPM | RESPIRATION RATE: 16 BRPM | TEMPERATURE: 98.9 F | OXYGEN SATURATION: 98 % | DIASTOLIC BLOOD PRESSURE: 70 MMHG | HEIGHT: 69 IN | BODY MASS INDEX: 32.73 KG/M2 | SYSTOLIC BLOOD PRESSURE: 112 MMHG | WEIGHT: 221 LBS

## 2020-02-10 DIAGNOSIS — F41.1 GAD (GENERALIZED ANXIETY DISORDER): Primary | ICD-10-CM

## 2020-02-10 NOTE — PROGRESS NOTES
Chief Complaint   Patient presents with    Anxiety     follow up      1. Have you been to the ER, urgent care clinic since your last visit? Yes ER for concussion . Hospitalized since your last visit? No    2. Have you seen or consulted any other health care providers outside of the [address] [place] [address] since your last visit? Include any pap smears or colon screening.  No

## 2020-02-10 NOTE — PROGRESS NOTES
HISTORY OF PRESENT ILLNESS  Bibi Goodman is a 45 y.o. female. HPI  Follow up ALFREDO. Taking prescribed lexapro. Wellbutrin added at last office visit due to feeling anxious and worrying, with difficulty concentrating at work. Reports she is able to stay more focused at work with less worrying since starting wellbutrin. Tolerating medication without adverse side effects. Still worrying at night at times. Patient Active Problem List   Diagnosis Code    ALFREDO (generalized anxiety disorder) F41.1     Current Outpatient Medications   Medication Sig    buPROPion (WELLBUTRIN) 75 mg tablet TAKE 1 TABLET BY MOUTH DAILY    escitalopram oxalate (LEXAPRO) 20 mg tablet TAKE 1 TABLET BY MOUTH EVERY DAY     No current facility-administered medications for this visit. Social History     Tobacco Use    Smoking status: Never Smoker    Smokeless tobacco: Never Used   Substance Use Topics    Alcohol use: No    Drug use: No     Blood pressure 112/70, pulse 77, temperature 98.9 °F (37.2 °C), temperature source Oral, resp. rate 16, height 5' 9\" (1.753 m), weight 221 lb (100.2 kg), last menstrual period 01/20/2020, SpO2 98 %. Review of Systems   Constitutional: Negative. Respiratory: Negative for cough and shortness of breath. Cardiovascular: Negative for chest pain and palpitations. Psychiatric/Behavioral: Negative for depression, hallucinations, substance abuse and suicidal ideas. The patient is nervous/anxious (improved) and has insomnia (occasional). All other systems reviewed and are negative. Physical Exam  Constitutional:       General: She is not in acute distress. Neck:      Musculoskeletal: Neck supple. Cardiovascular:      Rate and Rhythm: Normal rate and regular rhythm. Heart sounds: Normal heart sounds. Pulmonary:      Effort: Pulmonary effort is normal.      Breath sounds: Normal breath sounds. Lymphadenopathy:      Cervical: No cervical adenopathy.    Psychiatric:         Mood and Affect: Mood normal.         Behavior: Behavior normal.         Thought Content: Thought content normal.         ASSESSMENT and PLAN  Diagnoses and all orders for this visit:    1. ALFREDO (generalized anxiety disorder)    Symptoms improved with addition of low dose wellbutrin. Suggested increasing dose, patient declined at this time. Continue current medications. Follow up 6 months or sooner prn.

## 2020-02-12 ENCOUNTER — HOSPITAL ENCOUNTER (EMERGENCY)
Age: 45
Discharge: HOME | End: 2020-02-12
Attending: EMERGENCY MEDICINE
Payer: COMMERCIAL

## 2020-02-12 ENCOUNTER — APPOINTMENT (OUTPATIENT)
Dept: GENERAL RADIOLOGY | Age: 45
End: 2020-02-12
Attending: EMERGENCY MEDICINE
Payer: COMMERCIAL

## 2020-02-12 VITALS
TEMPERATURE: 98.4 F | HEIGHT: 70 IN | SYSTOLIC BLOOD PRESSURE: 141 MMHG | DIASTOLIC BLOOD PRESSURE: 86 MMHG | BODY MASS INDEX: 31.64 KG/M2 | WEIGHT: 221 LBS | OXYGEN SATURATION: 98 % | RESPIRATION RATE: 14 BRPM | HEART RATE: 77 BPM

## 2020-02-12 DIAGNOSIS — S42.401A CLOSED FRACTURE OF RIGHT ELBOW, INITIAL ENCOUNTER: ICD-10-CM
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
## 2020-02-12 PROCEDURE — 74011250637 HC RX REV CODE- 250/637: Performed by: EMERGENCY MEDICINE

## 2020-02-12 PROCEDURE — 73080 X-RAY EXAM OF ELBOW: CPT

## 2020-02-12 PROCEDURE — 99283 EMERGENCY DEPT VISIT LOW MDM: CPT

## 2020-02-12 PROCEDURE — 73110 X-RAY EXAM OF WRIST: CPT

## 2020-02-12 PROCEDURE — 75810000053 HC SPLINT APPLICATION

## 2020-02-12 RX ORDER — IBUPROFEN 400 MG/1
800 TABLET ORAL
Status: COMPLETED | OUTPATIENT
Start: 2020-02-12 | End: 2020-02-12

## 2020-02-12 RX ORDER — IBUPROFEN 800 MG/1
800 TABLET ORAL
Qty: 30 TAB | Refills: 0 | Status: SHIPPED | OUTPATIENT
Start: 2020-02-12 | End: 2021-02-11 | Stop reason: ALTCHOICE

## 2020-02-12 RX ADMIN — IBUPROFEN 800 MG: 400 TABLET ORAL at 22:05
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
## 2020-02-13 NOTE — ED NOTES
Discharge instructions reviewed with pt and copy given along with RX by this RN. Pt educated on follow up with Ortho MD as well as to manage pain with ice/heat, tylenol and motrin at home. Patient verbalized understanding of all education. Pt provided imaging disc per request to follow up with Orthopedics.

## 2020-06-29 DIAGNOSIS — F41.1 GAD (GENERALIZED ANXIETY DISORDER): ICD-10-CM

## 2020-06-29 RX ORDER — BUPROPION HYDROCHLORIDE 75 MG/1
TABLET ORAL
Qty: 30 TAB | Refills: 3 | Status: SHIPPED | OUTPATIENT
Start: 2020-06-29 | End: 2020-11-15

## 2020-07-10 DIAGNOSIS — F41.1 GAD (GENERALIZED ANXIETY DISORDER): ICD-10-CM

## 2020-07-10 RX ORDER — ESCITALOPRAM OXALATE 20 MG/1
TABLET ORAL
Qty: 30 TAB | Refills: 5 | Status: SHIPPED | OUTPATIENT
Start: 2020-07-10 | End: 2021-02-02 | Stop reason: SDUPTHER

## 2020-07-20 ENCOUNTER — VIRTUAL VISIT (OUTPATIENT)
Dept: FAMILY MEDICINE CLINIC | Age: 45
End: 2020-07-20

## 2020-07-20 DIAGNOSIS — F41.1 GAD (GENERALIZED ANXIETY DISORDER): Primary | ICD-10-CM

## 2020-07-20 DIAGNOSIS — N63.20 LEFT BREAST MASS: ICD-10-CM

## 2020-07-20 DIAGNOSIS — F32.A MILD DEPRESSIVE DISORDER: ICD-10-CM

## 2020-07-20 RX ORDER — LORATADINE 10 MG/1
TABLET ORAL
COMMUNITY
Start: 2020-06-04 | End: 2020-08-10 | Stop reason: ALTCHOICE

## 2020-07-20 RX ORDER — DOXYCYCLINE HYCLATE 100 MG
TABLET ORAL
COMMUNITY
Start: 2020-06-04 | End: 2020-08-10 | Stop reason: ALTCHOICE

## 2020-07-20 NOTE — PROGRESS NOTES
HISTORY OF PRESENT ILLNESS  Adrianne Yousif is a 45 y.o. female. Virtual follow up office visit and new symptom. Consent: Adrianne Yousif, who was seen by synchronous (real-time) audio-video technology, and/or her healthcare decision maker, is aware that this patient-initiated, Telehealth encounter on 7/20/2020 is a billable service, with coverage as determined by her insurance carrier. She is aware that she may receive a bill and has provided verbal consent to proceed: Yes. .Adrianne Yousif is a 45 y.o. female who was evaluated by an audio-video encounter for concerns as above. Patient identification was verified prior to start of the visit. A caregiver was present when appropriate. Due to this being a TeleHealth encounter (During IBXYW-14 public health emergency), evaluation of the following organ systems was limited: Vitals/Constitutional/EENT/Resp/CV/GI//MS/Neuro/Skin/Heme-Lymph-Imm. Pursuant to the emergency declaration under the [facility]5 waiver authority and the [employer] Act, this Virtual Visit was conducted, with patient's (and/or legal guardian's) consent, to reduce the patient's risk of exposure to COVID-19 and provide necessary medical care. Services were provided through a synchronous discussion virtually to substitute for in-person clinic visit. I was in the office. The patient was at home. HPI  History of mild depressive disorder and ALFREDO that has been exacerbated by COVID. Small dose wellbutrin added to lexapro. Initially felt improved, now feeling a bit more anxious. Working from home. Not getting much socialization. Lives with . States since starting wellbutrin she hasn't been sleeping as well. Reports taking med in the evening. Having more racing thoughts. Was doing SBE, felt a pea sized nodule in left breast, outer quadrant. No history of previous mass.   Last mammogram October 2019, normal.  No pain. Patient Active Problem List   Diagnosis Code    ALFREDO (generalized anxiety disorder) F41.1     Current Outpatient Medications   Medication Sig    escitalopram oxalate (LEXAPRO) 20 mg tablet TAKE 1 TABLET BY MOUTH EVERY DAY    buPROPion (WELLBUTRIN) 75 mg tablet TAKE 1 TABLET BY MOUTH DAILY    doxycycline (VIBRA-TABS) 100 mg tablet TK 1 T PO BID FOR 7 DAYS. TK WITH 8 OUNCES OF WATER TO AVOID STOMACH UPSET.  loratadine (CLARITIN) 10 mg tablet TK 1 T PO QD    ibuprofen (MOTRIN) 800 mg tablet Take 1 Tab by mouth every eight (8) hours as needed for Pain. No current facility-administered medications for this visit. Social History     Tobacco Use    Smoking status: Never Smoker    Smokeless tobacco: Never Used   Substance Use Topics    Alcohol use: No    Drug use: No       Review of Systems   Constitutional: Negative. Respiratory: Negative. Cardiovascular: Negative. Skin:        Left breast mass as stated. Psychiatric/Behavioral: Positive for depression (mild, intermittent). Negative for hallucinations, substance abuse and suicidal ideas. The patient is nervous/anxious (mild, intermittent) and has insomnia. All other systems reviewed and are negative. Physical Exam  Constitutional:       General: She is not in acute distress. Pulmonary:      Effort: Pulmonary effort is normal. No respiratory distress. Skin:     Comments: Points to left breast outer quadrant between 3-5 oclock where mass was palpated. Neurological:      Mental Status: She is alert. Psychiatric:         Mood and Affect: Mood normal.         Behavior: Behavior normal.         Thought Content: Thought content normal.         ASSESSMENT and PLAN  Diagnoses and all orders for this visit:    1. ALFREDO (generalized anxiety disorder)  Mild exacerbation. May be due to not sleeping well.   Recommend taking lexapro and wellbutrin in am rather than pm.  Stress reduction and relaxation techniques reviewed. 2. Mild depressive disorder ([facility] [address]  As above. Continue current medications for now. 3. Left breast mass  -     CHASE MAMMO LT DX INCL CAD; Future  -     US BREAST LT LIMITED=<3 QUAD; Future  Follow up 4 months or sooner prn. We discussed the expected course, resolution and complications of the diagnosis in detail. Medication risks, benefits, costs, interactions and side effects were discussed. The patient was advised to contact the office for worsening of condition or FTI as anticipated. The patient expressed understanding of the diagnosis and plan.

## 2020-07-20 NOTE — PROGRESS NOTES
Chief Complaint   Patient presents with    Breast Mass     left      1. Have you been to the ER, urgent care clinic since your last visit? Hospitalized since your last visit? No    2. Have you seen or consulted any other health care providers outside of the [address] [place] [address] since your last visit? Include any pap smears or colon screening.  No

## 2020-07-31 ENCOUNTER — HOSPITAL ENCOUNTER (OUTPATIENT)
Dept: MAMMOGRAPHY | Age: 45
Discharge: HOME | End: 2020-07-31
Payer: COMMERCIAL

## 2020-07-31 DIAGNOSIS — N63.20 LEFT BREAST MASS: ICD-10-CM

## 2020-07-31 PROCEDURE — 77061 BREAST TOMOSYNTHESIS UNI: CPT

## 2020-07-31 PROCEDURE — 76642 ULTRASOUND BREAST LIMITED: CPT

## 2020-08-10 ENCOUNTER — OFFICE VISIT (OUTPATIENT)
Dept: FAMILY MEDICINE CLINIC | Age: 45
End: 2020-08-10
Payer: COMMERCIAL

## 2020-08-10 VITALS
RESPIRATION RATE: 16 BRPM | BODY MASS INDEX: 27.77 KG/M2 | HEART RATE: 70 BPM | HEIGHT: 70 IN | TEMPERATURE: 98.5 F | WEIGHT: 194 LBS | OXYGEN SATURATION: 98 % | DIASTOLIC BLOOD PRESSURE: 70 MMHG | SYSTOLIC BLOOD PRESSURE: 110 MMHG

## 2020-08-10 DIAGNOSIS — F41.1 GAD (GENERALIZED ANXIETY DISORDER): Primary | ICD-10-CM

## 2020-08-10 PROCEDURE — 99213 OFFICE O/P EST LOW 20 MIN: CPT | Performed by: NURSE PRACTITIONER

## 2020-08-10 NOTE — PROGRESS NOTES
Chief Complaint   Patient presents with    Anxiety     follow up        1. Have you been to the ER, urgent care clinic since your last visit? Hospitalized since your last visit? No    2. Have you seen or consulted any other health care providers outside of the [address] [place] [address] since your last visit? Include any pap smears or colon screening.  No

## 2020-08-10 NOTE — PROGRESS NOTES
HISTORY OF PRESENT ILLNESS  Karla Pineda is a 45 y.o. female. HPI  Follow up ALFREDO. Taking lexapro and IR wellbutrin. Had some problems sleeping at night. Advised to take wellbutrin in am with lexapro. Now sleeping is improved. Anxiety stable. Reports no adverse side effects of medication. Patient Active Problem List   Diagnosis Code    ALFREDO (generalized anxiety disorder) F41.1     Current Outpatient Medications   Medication Sig    escitalopram oxalate (LEXAPRO) 20 mg tablet TAKE 1 TABLET BY MOUTH EVERY DAY    buPROPion (WELLBUTRIN) 75 mg tablet TAKE 1 TABLET BY MOUTH DAILY    ibuprofen (MOTRIN) 800 mg tablet Take 1 Tab by mouth every eight (8) hours as needed for Pain. No current facility-administered medications for this visit. Social History     Tobacco Use    Smoking status: Never Smoker    Smokeless tobacco: Never Used   Substance Use Topics    Alcohol use: No    Drug use: No     Blood pressure 110/70, pulse 70, temperature 98.5 °F (36.9 °C), temperature source Oral, resp. rate 16, height 5' 10\" (1.778 m), weight 194 lb (88 kg), last menstrual period 07/23/2020, SpO2 98 %. Review of Systems   Respiratory: Negative. Cardiovascular: Negative. Psychiatric/Behavioral: Negative for depression, hallucinations, substance abuse and suicidal ideas. The patient is nervous/anxious (stable). The patient does not have insomnia. All other systems reviewed and are negative. Physical Exam  Constitutional:       General: She is not in acute distress. Cardiovascular:      Rate and Rhythm: Normal rate and regular rhythm. Heart sounds: Normal heart sounds. Pulmonary:      Effort: Pulmonary effort is normal.      Breath sounds: Normal breath sounds. Musculoskeletal:      Right lower leg: No edema. Left lower leg: No edema. Neurological:      Mental Status: She is alert.    Psychiatric:         Mood and Affect: Mood normal.         Behavior: Behavior normal. ASSESSMENT and PLAN  Diagnoses and all orders for this visit:    1. ALFREDO (generalized anxiety disorder)    Stable on wellbutrin and lexapro combined therapy. Follow up 6 months with CPE. We discussed the expected course, resolution and complications of the diagnosis in detail. Medication risks, benefits, costs, interactions and side effects were discussed. The patient was advised to contact the office for worsening of condition or FTI as anticipated. The patient expressed understanding of the diagnosis and plan.

## 2020-11-11 ENCOUNTER — HOSPITAL ENCOUNTER (OUTPATIENT)
Dept: MAMMOGRAPHY | Age: 45
Discharge: HOME | End: 2020-11-11
Payer: COMMERCIAL

## 2020-11-11 ENCOUNTER — TRANSCRIBE ORDER (OUTPATIENT)
Dept: REGISTRATION | Age: 45
End: 2020-11-11

## 2020-11-11 DIAGNOSIS — Z12.31 VISIT FOR SCREENING MAMMOGRAM: Primary | ICD-10-CM

## 2020-11-11 DIAGNOSIS — Z12.31 VISIT FOR SCREENING MAMMOGRAM: ICD-10-CM

## 2020-11-11 PROCEDURE — 77063 BREAST TOMOSYNTHESIS BI: CPT

## 2020-11-14 DIAGNOSIS — F41.1 GAD (GENERALIZED ANXIETY DISORDER): ICD-10-CM

## 2020-11-15 RX ORDER — BUPROPION HYDROCHLORIDE 75 MG/1
TABLET ORAL
Qty: 30 TAB | Refills: 3 | Status: SHIPPED | OUTPATIENT
Start: 2020-11-15 | End: 2021-03-18

## 2021-02-02 DIAGNOSIS — F41.1 GAD (GENERALIZED ANXIETY DISORDER): ICD-10-CM

## 2021-02-02 RX ORDER — ESCITALOPRAM OXALATE 20 MG/1
20 TABLET ORAL DAILY
Qty: 30 TAB | Refills: 0 | Status: SHIPPED | OUTPATIENT
Start: 2021-02-02 | End: 2021-03-08

## 2021-02-02 NOTE — TELEPHONE ENCOUNTER
Last Visit: 8/10/20 NP [place]  Next Appointment: 2/11/21 NP [place]  Previous Refill Encounter(s): 7/10/20 30 + 5    Requested Prescriptions     Pending Prescriptions Disp Refills    escitalopram oxalate (LEXAPRO) 20 mg tablet 30 Tab 5     Sig: Take 1 Tab by mouth daily.

## 2021-02-09 ENCOUNTER — TELEPHONE (OUTPATIENT)
Dept: FAMILY MEDICINE CLINIC | Age: 46
End: 2021-02-09

## 2021-02-09 NOTE — TELEPHONE ENCOUNTER
----- Message from Robbi Tovar sent at 2/8/2021 12:30 PM EST -----  Regarding: NP [place]/Telephone  General Message/Vendor Calls    Caller's first and last name: pt      Reason for call: Rash after Covid-19 Vaccine 10 days ago      Callback required yes/no and why: yes, to discuss      Best contact number(s): [phone]      Details to clarify the request: pt received Covid-19 Vaccine 10 days go and now has a rash at the injection site and needs someone to call her back.       Robbi Tovar

## 2021-02-11 ENCOUNTER — OFFICE VISIT (OUTPATIENT)
Dept: FAMILY MEDICINE CLINIC | Age: 46
End: 2021-02-11
Payer: COMMERCIAL

## 2021-02-11 VITALS
DIASTOLIC BLOOD PRESSURE: 79 MMHG | TEMPERATURE: 98.7 F | HEART RATE: 99 BPM | BODY MASS INDEX: 27.83 KG/M2 | WEIGHT: 194.4 LBS | RESPIRATION RATE: 14 BRPM | OXYGEN SATURATION: 99 % | HEIGHT: 70 IN | SYSTOLIC BLOOD PRESSURE: 125 MMHG

## 2021-02-11 DIAGNOSIS — Z00.00 ROUTINE GENERAL MEDICAL EXAMINATION AT A HEALTH CARE FACILITY: Primary | ICD-10-CM

## 2021-02-11 DIAGNOSIS — T50.Z95A ADVERSE EFFECT OF VACCINE, INITIAL ENCOUNTER: ICD-10-CM

## 2021-02-11 DIAGNOSIS — F41.1 GAD (GENERALIZED ANXIETY DISORDER): ICD-10-CM

## 2021-02-11 PROCEDURE — 99396 PREV VISIT EST AGE 40-64: CPT | Performed by: NURSE PRACTITIONER

## 2021-02-11 NOTE — PROGRESS NOTES
HISTORY OF PRESENT ILLNESS  Maverick Romeo is a 39 y.o. female. HPI  Presents for CPE. Following healthy diet and exercising regularly. Anxiety/depression. Taking wellbutrin and lexapro with good sx control. C/o rash that developed on her right upper arm several days after receiving COVID vaccine. Mild itch and redness. Has been taking claritin with mild sx improvement. No fever/chills. UTD with pap/mammogram.  Past Medical History:   Diagnosis Date    Chronic sinusitis      Patient Active Problem List   Diagnosis Code    ALFREDO (generalized anxiety disorder) F41.1     Current Outpatient Medications   Medication Sig    escitalopram oxalate (LEXAPRO) 20 mg tablet Take 1 Tab by mouth daily.  buPROPion (WELLBUTRIN) 75 mg tablet TAKE 1 TABLET BY MOUTH DAILY     No current facility-administered medications for this visit. Social History     Tobacco Use    Smoking status: Never Smoker    Smokeless tobacco: Never Used   Substance Use Topics    Alcohol use: No    Drug use: No     Blood pressure 125/79, pulse 99, temperature 98.7 °F (37.1 °C), temperature source Temporal, resp. rate 14, height 5' 10\" (1.778 m), weight 194 lb 6.4 oz (88.2 kg), SpO2 99 %. Review of Systems   Constitutional: Negative for chills, fever and malaise/fatigue. Respiratory: Negative for cough and shortness of breath. Cardiovascular: Negative for chest pain, palpitations and leg swelling. Skin: Positive for itching and rash. Psychiatric/Behavioral: Negative for depression, substance abuse and suicidal ideas. The patient is not nervous/anxious and does not have insomnia. All other systems reviewed and are negative. Physical Exam  Constitutional:       General: She is not in acute distress. Appearance: Normal appearance. HENT:      Right Ear: Tympanic membrane and ear canal normal.      Left Ear: Tympanic membrane and ear canal normal.   Neck:      Musculoskeletal: Neck supple.    Cardiovascular:      Rate and Rhythm: Normal rate and regular rhythm. Heart sounds: Normal heart sounds. Pulmonary:      Effort: Pulmonary effort is normal.      Breath sounds: Normal breath sounds. Abdominal:      General: There is no distension. Palpations: Abdomen is soft. Tenderness: There is no abdominal tenderness. Musculoskeletal: Normal range of motion. Right lower leg: No edema. Left lower leg: No edema. Lymphadenopathy:      Cervical: No cervical adenopathy. Skin:     Comments: Erythematous non raised rash on right upper arm with central clearing. No swelling or warmth. Non TTP. Full arm ROM. Neurological:      General: No focal deficit present. Mental Status: She is alert. Psychiatric:         Mood and Affect: Mood normal.         Behavior: Behavior normal.         ASSESSMENT and PLAN  Diagnoses and all orders for this visit:    1. Routine general medical examination at a health care facility  -     CBC W/O DIFF; Future  -     METABOLIC PANEL, COMPREHENSIVE; Future  -     LIPID PANEL; Future  Age appropriate health maintenance and prevention reviewed. Follow healthy diet and exercise regularly at least 4-5x weekly. Fasting labs sent. 2. ALFREDO (generalized anxiety disorder)  Stable. Continue current treatment. 3. Adverse effect of vaccine, initial encounter  Local reaction. Recommend switch to allegra daily. Hydrocortisone cream topically bid. Prophylactic antihistamine 48 hours prior to next injection. Follow up prn if sx worsen or FTI. We discussed the expected course, resolution and complications of the diagnosis in detail. Medication risks, benefits, costs, interactions and side effects were discussed. The patient was advised to contact the office for worsening of condition or FTI as anticipated. The patient expressed understanding of the diagnosis and plan.

## 2021-02-11 NOTE — PROGRESS NOTES
Chief Complaint   Patient presents with    Complete Physical    Other     redness, area after covid injectio 14 days ago       1. Have you been to the ER, urgent care clinic since your last visit? Hospitalized since your last visit? No    2. Have you seen or consulted any other health care providers outside of the [address] [place] [address] since your last visit? Include any pap smears or colon screening.  No    3 most recent PHQ Screens 8/10/2020   Little interest or pleasure in doing things Not at all   Feeling down, depressed, irritable, or hopeless Not at all   Total Score PHQ 2 0

## 2021-02-12 LAB
ALBUMIN SERPL-MCNC: 4.7 G/DL (ref 3.8–4.8)
ALBUMIN/GLOB SERPL: 2.4 {RATIO} (ref 1.2–2.2)
ALP SERPL-CCNC: 75 IU/L (ref 39–117)
ALT SERPL-CCNC: 14 IU/L (ref 0–32)
AST SERPL-CCNC: 15 IU/L (ref 0–40)
BILIRUB SERPL-MCNC: 0.7 MG/DL (ref 0–1.2)
BUN SERPL-MCNC: 14 MG/DL (ref 6–24)
BUN/CREAT SERPL: 19 (ref 9–23)
CALCIUM SERPL-MCNC: 9.3 MG/DL (ref 8.7–10.2)
CHLORIDE SERPL-SCNC: 103 MMOL/L (ref 96–106)
CHOLEST SERPL-MCNC: 151 MG/DL (ref 100–199)
CO2 SERPL-SCNC: 26 MMOL/L (ref 20–29)
CREAT SERPL-MCNC: 0.72 MG/DL (ref 0.57–1)
ERYTHROCYTE [DISTWIDTH] IN BLOOD BY AUTOMATED COUNT: 11.6 % (ref 11.7–15.4)
GLOBULIN SER CALC-MCNC: 2 G/DL (ref 1.5–4.5)
GLUCOSE SERPL-MCNC: 98 MG/DL (ref 65–99)
HCT VFR BLD AUTO: 41 % (ref 34–46.6)
HDLC SERPL-MCNC: 48 MG/DL
HGB BLD-MCNC: 13.7 G/DL (ref 11.1–15.9)
INTERPRETATION, 910389: NORMAL
LDLC SERPL CALC-MCNC: 92 MG/DL (ref 0–99)
MCH RBC QN AUTO: 30.2 PG (ref 26.6–33)
MCHC RBC AUTO-ENTMCNC: 33.4 G/DL (ref 31.5–35.7)
MCV RBC AUTO: 90 FL (ref 79–97)
PLATELET # BLD AUTO: 163 X10E3/UL (ref 150–450)
POTASSIUM SERPL-SCNC: 4.7 MMOL/L (ref 3.5–5.2)
PROT SERPL-MCNC: 6.7 G/DL (ref 6–8.5)
RBC # BLD AUTO: 4.54 X10E6/UL (ref 3.77–5.28)
SODIUM SERPL-SCNC: 140 MMOL/L (ref 134–144)
TRIGL SERPL-MCNC: 52 MG/DL (ref 0–149)
VLDLC SERPL CALC-MCNC: 11 MG/DL (ref 5–40)
WBC # BLD AUTO: 5.8 X10E3/UL (ref 3.4–10.8)

## 2021-03-18 DIAGNOSIS — F41.1 GAD (GENERALIZED ANXIETY DISORDER): ICD-10-CM

## 2021-03-18 RX ORDER — BUPROPION HYDROCHLORIDE 75 MG/1
TABLET ORAL
Qty: 30 TAB | Refills: 3 | Status: SHIPPED | OUTPATIENT
Start: 2021-03-18 | End: 2021-07-16

## 2021-07-16 DIAGNOSIS — F41.1 GAD (GENERALIZED ANXIETY DISORDER): ICD-10-CM

## 2021-07-16 RX ORDER — BUPROPION HYDROCHLORIDE 75 MG/1
TABLET ORAL
Qty: 30 TABLET | Refills: 3 | Status: SHIPPED | OUTPATIENT
Start: 2021-07-16 | End: 2021-11-21

## 2021-10-16 DIAGNOSIS — F41.1 GAD (GENERALIZED ANXIETY DISORDER): ICD-10-CM

## 2021-10-17 RX ORDER — ESCITALOPRAM OXALATE 20 MG/1
20 TABLET ORAL DAILY
Qty: 30 TABLET | Refills: 0 | Status: SHIPPED | OUTPATIENT
Start: 2021-10-17 | End: 2021-11-21

## 2021-11-12 ENCOUNTER — TRANSCRIBE ORDER (OUTPATIENT)
Dept: MAMMOGRAPHY | Age: 46
End: 2021-11-12

## 2021-11-12 ENCOUNTER — HOSPITAL ENCOUNTER (OUTPATIENT)
Dept: MAMMOGRAPHY | Age: 46
Discharge: HOME | End: 2021-11-12
Payer: COMMERCIAL

## 2021-11-12 DIAGNOSIS — Z12.31 VISIT FOR SCREENING MAMMOGRAM: Primary | ICD-10-CM

## 2021-11-12 DIAGNOSIS — Z12.31 VISIT FOR SCREENING MAMMOGRAM: ICD-10-CM

## 2021-11-12 PROCEDURE — 77063 BREAST TOMOSYNTHESIS BI: CPT

## 2021-12-30 DIAGNOSIS — F41.1 GAD (GENERALIZED ANXIETY DISORDER): ICD-10-CM

## 2021-12-30 RX ORDER — ESCITALOPRAM OXALATE 20 MG/1
20 TABLET ORAL DAILY
Qty: 30 TABLET | Refills: 0 | Status: SHIPPED | OUTPATIENT
Start: 2021-12-30 | End: 2022-01-31

## 2022-01-31 DIAGNOSIS — F41.1 GAD (GENERALIZED ANXIETY DISORDER): ICD-10-CM

## 2022-01-31 RX ORDER — ESCITALOPRAM OXALATE 20 MG/1
20 TABLET ORAL DAILY
Qty: 30 TABLET | Refills: 0 | Status: SHIPPED | OUTPATIENT
Start: 2022-01-31 | End: 2022-02-28

## 2022-03-19 PROBLEM — F41.1 GAD (GENERALIZED ANXIETY DISORDER): Status: ACTIVE | Noted: 2017-10-12

## 2022-03-22 DIAGNOSIS — F41.1 GAD (GENERALIZED ANXIETY DISORDER): ICD-10-CM

## 2022-03-22 RX ORDER — ESCITALOPRAM OXALATE 20 MG/1
TABLET ORAL
Qty: 15 TABLET | Refills: 0 | Status: CANCELLED | OUTPATIENT
Start: 2022-03-22

## 2022-03-28 DIAGNOSIS — F41.1 GAD (GENERALIZED ANXIETY DISORDER): ICD-10-CM

## 2022-03-28 RX ORDER — BUPROPION HYDROCHLORIDE 75 MG/1
75 TABLET ORAL DAILY
Qty: 30 TABLET | Refills: 0 | Status: SHIPPED | OUTPATIENT
Start: 2022-03-28 | End: 2022-04-14 | Stop reason: SDUPTHER

## 2022-04-14 ENCOUNTER — OFFICE VISIT (OUTPATIENT)
Dept: FAMILY MEDICINE CLINIC | Age: 47
End: 2022-04-14
Payer: COMMERCIAL

## 2022-04-14 VITALS
WEIGHT: 219.2 LBS | TEMPERATURE: 97.7 F | RESPIRATION RATE: 18 BRPM | BODY MASS INDEX: 31.38 KG/M2 | DIASTOLIC BLOOD PRESSURE: 76 MMHG | OXYGEN SATURATION: 98 % | HEIGHT: 70 IN | SYSTOLIC BLOOD PRESSURE: 115 MMHG | HEART RATE: 74 BPM

## 2022-04-14 DIAGNOSIS — F41.1 GAD (GENERALIZED ANXIETY DISORDER): Primary | ICD-10-CM

## 2022-04-14 PROCEDURE — 99213 OFFICE O/P EST LOW 20 MIN: CPT | Performed by: NURSE PRACTITIONER

## 2022-04-14 RX ORDER — BUPROPION HYDROCHLORIDE 75 MG/1
75 TABLET ORAL DAILY
Qty: 90 TABLET | Refills: 1 | Status: SHIPPED | OUTPATIENT
Start: 2022-04-14

## 2022-04-14 RX ORDER — ESCITALOPRAM OXALATE 20 MG/1
TABLET ORAL
Qty: 90 TABLET | Refills: 1 | Status: SHIPPED | OUTPATIENT
Start: 2022-04-14

## 2022-04-14 NOTE — PROGRESS NOTES
HISTORY OF PRESENT ILLNESS  Romeo Frankel is a 42 y.o. female. HPI  Overdue follow up ALFREDO. Taking lexapro and wellbutrin with good symptom control. Reports no adverse side effects of medication. Patient Active Problem List   Diagnosis Code    ALFREDO (generalized anxiety disorder) F41.1     Current Outpatient Medications   Medication Sig    buPROPion (WELLBUTRIN) 75 mg tablet Take 1 Tablet by mouth daily.  escitalopram oxalate (LEXAPRO) 20 mg tablet TAKE 1 TABLET BY MOUTH DAILY     No current facility-administered medications for this visit. Social History     Tobacco Use    Smoking status: Never Smoker    Smokeless tobacco: Never Used   Vaping Use    Vaping Use: Never used   Substance Use Topics    Alcohol use: No    Drug use: No     Blood pressure 115/76, pulse 74, temperature 97.7 °F (36.5 °C), temperature source Temporal, resp. rate 18, height 5' 10\" (1.778 m), weight 219 lb 3.2 oz (99.4 kg), last menstrual period 04/09/2022, SpO2 98 %. Review of Systems   Respiratory: Negative for shortness of breath. Cardiovascular: Negative for chest pain and palpitations. Psychiatric/Behavioral: Negative for depression and suicidal ideas. The patient is nervous/anxious (stable). The patient does not have insomnia. All other systems reviewed and are negative. Physical Exam  Constitutional:       General: She is not in acute distress. Cardiovascular:      Rate and Rhythm: Normal rate and regular rhythm. Heart sounds: Normal heart sounds. Pulmonary:      Effort: Pulmonary effort is normal.      Breath sounds: Normal breath sounds. Musculoskeletal:      Cervical back: Neck supple. Lymphadenopathy:      Cervical: No cervical adenopathy. Skin:     General: Skin is warm and dry. Neurological:      Mental Status: She is alert.    Psychiatric:         Mood and Affect: Mood normal.         Behavior: Behavior normal.         ASSESSMENT and PLAN  Diagnoses and all orders for this visit: 1. ALFREDO (generalized anxiety disorder)  -     buPROPion (WELLBUTRIN) 75 mg tablet; Take 1 Tablet by mouth daily. -     escitalopram oxalate (LEXAPRO) 20 mg tablet; TAKE 1 TABLET BY MOUTH DAILY    Stable on current treatment. Follow up 6 months with CPE. We discussed the expected course, resolution and complications of the diagnosis in detail. Medication risks, benefits, costs, interactions and side effects were discussed. The patient was advised to contact the office for worsening of condition or FTI as anticipated. The patient expressed understanding of the diagnosis and plan.

## 2022-04-14 NOTE — PROGRESS NOTES
Chief Complaint   Patient presents with    Follow-up         1. \"Have you been to the ER, urgent care clinic since your last visit? Hospitalized since your last visit? \" No    2. \"Have you seen or consulted any other health care providers outside of the [address] [place] [address] since your last visit? \" No     3. For patients aged 39-70: Has the patient had a colonoscopy / FIT/ Cologuard? NA - based on age      If the patient is female:    4. For patients aged 41-77: Has the patient had a mammogram within the past 2 years? Yes - no Care Gap present      5. For patients aged 21-65: Has the patient had a pap smear?  Yes - no Care Gap present         3 most recent PHQ Screens 4/14/2022   Little interest or pleasure in doing things Not at all   Feeling down, depressed, irritable, or hopeless Not at all   Total Score PHQ 2 0       Health Maintenance Due   Topic Date Due    Hepatitis C Screening  Never done    Depression Screen  08/10/2021    COVID-19 Vaccine (3 - Booster) 03/31/2022

## 2022-08-22 ENCOUNTER — OFFICE VISIT (OUTPATIENT)
Dept: FAMILY MEDICINE CLINIC | Age: 47
End: 2022-08-22
Payer: COMMERCIAL

## 2022-08-22 VITALS
HEART RATE: 78 BPM | DIASTOLIC BLOOD PRESSURE: 72 MMHG | OXYGEN SATURATION: 99 % | RESPIRATION RATE: 16 BRPM | HEIGHT: 70 IN | SYSTOLIC BLOOD PRESSURE: 120 MMHG | WEIGHT: 221 LBS | TEMPERATURE: 97.9 F | BODY MASS INDEX: 31.64 KG/M2

## 2022-08-22 DIAGNOSIS — L98.9 SKIN LESION: ICD-10-CM

## 2022-08-22 DIAGNOSIS — F41.1 GAD (GENERALIZED ANXIETY DISORDER): Primary | ICD-10-CM

## 2022-08-22 PROCEDURE — 99213 OFFICE O/P EST LOW 20 MIN: CPT | Performed by: NURSE PRACTITIONER

## 2022-08-22 NOTE — PROGRESS NOTES
HISTORY OF PRESENT ILLNESS  Shellie Tejeda is a 42 y.o. female. HPI  Follow up health problems, acute issue. ALFREDO. Taking lexapro and wellbutrin with good symptom control. Reports no adverse effects of medication. C/o small lesion on right inner thigh. Has had for years. Has not grown in size. Has changed color. Patient Active Problem List   Diagnosis Code    ALFREDO (generalized anxiety disorder) F41.1     Current Outpatient Medications   Medication Sig    triprolidine/pseudoephedrine (ANTIHISTAMINE PO) Take  by mouth. buPROPion (WELLBUTRIN) 75 mg tablet Take 1 Tablet by mouth daily. escitalopram oxalate (LEXAPRO) 20 mg tablet TAKE 1 TABLET BY MOUTH DAILY     No current facility-administered medications for this visit. Social History     Tobacco Use    Smoking status: Never    Smokeless tobacco: Never   Vaping Use    Vaping Use: Never used   Substance Use Topics    Alcohol use: No    Drug use: No     Blood pressure 120/72, pulse 78, temperature 97.9 °F (36.6 °C), temperature source Temporal, resp. rate 16, height 5' 10\" (1.778 m), weight 221 lb (100.2 kg), SpO2 99 %. Review of Systems   Respiratory: Negative. Cardiovascular: Negative. Skin:         Skin lesion. Psychiatric/Behavioral:  Negative for depression and suicidal ideas. The patient is nervous/anxious (stable). The patient does not have insomnia. All other systems reviewed and are negative. Physical Exam  Constitutional:       General: She is not in acute distress. Cardiovascular:      Rate and Rhythm: Normal rate and regular rhythm. Heart sounds: Normal heart sounds. Pulmonary:      Effort: Pulmonary effort is normal.      Breath sounds: Normal breath sounds. Musculoskeletal:      Cervical back: Neck supple. Lymphadenopathy:      Cervical: No cervical adenopathy. Skin:     General: Skin is warm and dry. Comments: Approximately 4mm non raised pink lesion on right inner thigh.    Neurological:      Mental Status: She is alert. Psychiatric:         Mood and Affect: Mood normal.         Behavior: Behavior normal.       ASSESSMENT and PLAN  Diagnoses and all orders for this visit:    1. ALFREDO (generalized anxiety disorder)  Stable on current medications. 2. Skin lesion  Benign cherry angioma vs.mole. Recommend dermatology referral for yearly skin checks. Follow up 6 months.

## 2022-08-22 NOTE — PROGRESS NOTES
Chief Complaint   Patient presents with    Skin Problem     Mole on upper right thigh x  changed color      Patient has mole on upper right thigh, denies size increase, and pain. Patient report changed color. Patient is unsure when mole had appeared. Vitals:    08/22/22 1144   BP: 120/72   Pulse: 78   Resp: 16   Temp: 97.9 °F (36.6 °C)   TempSrc: Temporal   SpO2: 99%   Weight: 221 lb (100.2 kg)   Height: 5' 10\" (1.778 m)   PainSc:   0 - No pain         Health Maintenance will be followed up by PCP. 1. Have you been to the ER, urgent care clinic since your last visit? Hospitalized since your last visit? No    2. Have you seen or consulted any other health care providers outside of the [address] [place] [address] since your last visit? Include any pap smears or colon screening.  No
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
## 2022-10-24 ENCOUNTER — TRANSCRIBE ORDER (OUTPATIENT)
Dept: SCHEDULING | Age: 47
End: 2022-10-24

## 2022-10-24 DIAGNOSIS — Z12.31 BREAST CANCER SCREENING BY MAMMOGRAM: Primary | ICD-10-CM

## 2022-10-25 ENCOUNTER — OFFICE VISIT (OUTPATIENT)
Dept: ORTHOPEDIC SURGERY | Age: 47
End: 2022-10-25
Payer: COMMERCIAL

## 2022-10-25 VITALS — HEIGHT: 70 IN | BODY MASS INDEX: 30.78 KG/M2 | WEIGHT: 215 LBS

## 2022-10-25 DIAGNOSIS — M25.521 RIGHT ELBOW PAIN: Primary | ICD-10-CM

## 2022-10-25 DIAGNOSIS — M24.021 LOOSE BODY IN RIGHT ELBOW: ICD-10-CM

## 2022-10-25 DIAGNOSIS — M24.821 ELBOW LOCKING, RIGHT: ICD-10-CM

## 2022-10-25 PROCEDURE — 99213 OFFICE O/P EST LOW 20 MIN: CPT | Performed by: ORTHOPAEDIC SURGERY

## 2022-11-14 ENCOUNTER — HOSPITAL ENCOUNTER (OUTPATIENT)
Dept: MAMMOGRAPHY | Age: 47
Discharge: HOME | End: 2022-11-14
Payer: COMMERCIAL

## 2022-11-14 DIAGNOSIS — Z12.31 BREAST CANCER SCREENING BY MAMMOGRAM: ICD-10-CM

## 2022-11-14 PROCEDURE — 77063 BREAST TOMOSYNTHESIS BI: CPT

## 2022-11-26 DIAGNOSIS — F41.1 GAD (GENERALIZED ANXIETY DISORDER): ICD-10-CM

## 2022-11-27 RX ORDER — ESCITALOPRAM OXALATE 20 MG/1
TABLET ORAL
Qty: 90 TABLET | Refills: 1 | Status: SHIPPED | OUTPATIENT
Start: 2022-11-27

## 2022-11-27 RX ORDER — BUPROPION HYDROCHLORIDE 75 MG/1
TABLET ORAL
Qty: 90 TABLET | Refills: 1 | Status: SHIPPED | OUTPATIENT
Start: 2022-11-27

## 2022-12-08 DIAGNOSIS — F41.1 GAD (GENERALIZED ANXIETY DISORDER): ICD-10-CM

## 2022-12-08 RX ORDER — BUPROPION HYDROCHLORIDE 75 MG/1
75 TABLET ORAL DAILY
Qty: 90 TABLET | Refills: 1 | Status: SHIPPED | OUTPATIENT
Start: 2022-12-08

## 2022-12-08 RX ORDER — ESCITALOPRAM OXALATE 20 MG/1
20 TABLET ORAL DAILY
Qty: 90 TABLET | Refills: 1 | Status: SHIPPED | OUTPATIENT
Start: 2022-12-08

## 2022-12-08 NOTE — TELEPHONE ENCOUNTER
NP Robert,    Patient can no longer use [facility].  New rx's need to be sent to [facility] per patient Tabblot message. Thanks, Kati Crowley    Last Visit: 8/22/22 KIRSTY Soto  Next Appointment: None  Previous Refill Encounter(s): 11/27/22 90 + 1 (both- [facility])    Requested Prescriptions     Pending Prescriptions Disp Refills    buPROPion (WELLBUTRIN) 75 mg tablet 90 Tablet 1     Sig: Take 1 Tablet by mouth daily. escitalopram oxalate (LEXAPRO) 20 mg tablet 90 Tablet 1     Sig: Take 1 Tablet by mouth daily. For [facility] [facility] in place:   Recommendation Provided To:    Intervention Detail: New Rx: 2, reason: Patient Preference  Gap Closed?:   Intervention Accepted By:   Time Spent (min): 10

## 2023-04-21 DIAGNOSIS — Z12.31 BREAST CANCER SCREENING BY MAMMOGRAM: Primary | ICD-10-CM

## 2023-05-18 RX ORDER — BUPROPION HYDROCHLORIDE 75 MG/1
75 TABLET ORAL DAILY
COMMUNITY
Start: 2022-12-08 | End: 2023-06-02

## 2023-05-18 RX ORDER — ESCITALOPRAM OXALATE 20 MG/1
20 TABLET ORAL DAILY
COMMUNITY
Start: 2022-12-08 | End: 2023-06-05

## 2023-06-02 RX ORDER — BUPROPION HYDROCHLORIDE 75 MG/1
75 TABLET ORAL DAILY
Qty: 90 TABLET | Refills: 0 | Status: SHIPPED | OUTPATIENT
Start: 2023-06-02

## 2023-06-05 RX ORDER — ESCITALOPRAM OXALATE 20 MG/1
TABLET ORAL
Qty: 90 TABLET | Refills: 0 | Status: SHIPPED | OUTPATIENT
Start: 2023-06-05

## 2023-06-05 NOTE — TELEPHONE ENCOUNTER
Chief Complaint   Patient presents with    Medication Refill     Last office visit 10/25/2022 No upcoming follow Up appointments

## 2023-08-31 RX ORDER — BUPROPION HYDROCHLORIDE 75 MG/1
TABLET ORAL
Qty: 30 TABLET | Refills: 0 | OUTPATIENT
Start: 2023-08-31

## 2023-09-05 NOTE — TELEPHONE ENCOUNTER
PCP: Reid Tsang, APRN - NP    Last appt: [unfilled]  No future appointments.     Requested Prescriptions     Pending Prescriptions Disp Refills    escitalopram (LEXAPRO) 20 MG tablet [Pharmacy Med Name: ESCITALOPRAM TABS 20MG] 90 tablet 3     Sig: TAKE 1 TABLET DAILY (APPOINTMENT DUE)

## 2023-09-06 RX ORDER — ESCITALOPRAM OXALATE 20 MG/1
20 TABLET ORAL DAILY
Qty: 30 TABLET | Refills: 0 | Status: SHIPPED | OUTPATIENT
Start: 2023-09-06 | End: 2023-09-25 | Stop reason: SDUPTHER

## 2023-09-21 ENCOUNTER — TRANSCRIBE ORDERS (OUTPATIENT)
Facility: HOSPITAL | Age: 48
End: 2023-09-21

## 2023-09-21 DIAGNOSIS — Z12.31 VISIT FOR SCREENING MAMMOGRAM: Primary | ICD-10-CM

## 2023-09-25 ENCOUNTER — TELEMEDICINE (OUTPATIENT)
Age: 48
End: 2023-09-25
Payer: COMMERCIAL

## 2023-09-25 DIAGNOSIS — N92.4 EXCESSIVE BLEEDING IN PREMENOPAUSAL PERIOD: ICD-10-CM

## 2023-09-25 DIAGNOSIS — F41.1 GAD (GENERALIZED ANXIETY DISORDER): Primary | ICD-10-CM

## 2023-09-25 PROCEDURE — 99213 OFFICE O/P EST LOW 20 MIN: CPT | Performed by: NURSE PRACTITIONER

## 2023-09-25 RX ORDER — BUPROPION HYDROCHLORIDE 75 MG/1
75 TABLET ORAL DAILY
Qty: 90 TABLET | Refills: 1 | Status: SHIPPED | OUTPATIENT
Start: 2023-09-25

## 2023-09-25 RX ORDER — ESCITALOPRAM OXALATE 20 MG/1
20 TABLET ORAL DAILY
Qty: 90 TABLET | Refills: 1 | Status: SHIPPED | OUTPATIENT
Start: 2023-09-25 | End: 2023-09-28 | Stop reason: SDUPTHER

## 2023-09-25 RX ORDER — NORETHINDRONE ACETATE AND ETHINYL ESTRADIOL 1MG-20(21)
1 KIT ORAL DAILY
COMMUNITY

## 2023-09-25 ASSESSMENT — PATIENT HEALTH QUESTIONNAIRE - PHQ9
SUM OF ALL RESPONSES TO PHQ QUESTIONS 1-9: 0
SUM OF ALL RESPONSES TO PHQ9 QUESTIONS 1 & 2: 0
1. LITTLE INTEREST OR PLEASURE IN DOING THINGS: 0
SUM OF ALL RESPONSES TO PHQ QUESTIONS 1-9: 0
2. FEELING DOWN, DEPRESSED OR HOPELESS: 0

## 2023-09-25 ASSESSMENT — ENCOUNTER SYMPTOMS
CHEST TIGHTNESS: 0
SHORTNESS OF BREATH: 0

## 2023-09-25 NOTE — PROGRESS NOTES
Resp: no respiratory distress   Neuro: no gross deficits   Skin: no discoloration or lesions of concern on visible areas   Psychiatric: normal affect, consistent with stated mood, no evidence of hallucinations     Additional exam findings: We discussed the expected course, resolution and complications of the diagnosis(es) in detail. Medication risks, benefits, costs, interactions, and alternatives were discussed as indicated. I advised her to contact the office if her condition worsens, changes or fails to improve as anticipated. She expressed understanding with the diagnosis(es) and plan. Chelsea Moss, was evaluated through a synchronous (real-time) audio-video encounter. The patient (or guardian if applicable) is aware that this is a billable service, which includes applicable co-pays. This Virtual Visit was conducted with patient's (and/or legal guardian's) consent. Patient identification was verified, and a caregiver was present when appropriate.    The patient was located at Home: [address]  Provider was located at [facility] (Appt Dept): [address] [facility],  [address] TANGELA Clinton NP

## 2023-09-28 DIAGNOSIS — F41.1 GAD (GENERALIZED ANXIETY DISORDER): ICD-10-CM

## 2023-09-28 NOTE — TELEPHONE ENCOUNTER
Patient comment: I have 3 months being delivered on the 4th Oct however I have no tablets left. Would it be possible to have a script sent to [facility] on [address] for the additional tablets?  Kind regards, Faizan Murillo

## 2023-09-29 RX ORDER — ESCITALOPRAM OXALATE 20 MG/1
20 TABLET ORAL DAILY
Qty: 30 TABLET | Refills: 1 | Status: SHIPPED | OUTPATIENT
Start: 2023-09-29

## 2023-12-07 ENCOUNTER — HOSPITAL ENCOUNTER (OUTPATIENT)
Facility: HOSPITAL | Age: 48
Discharge: HOME | End: 2023-12-07
Payer: COMMERCIAL

## 2023-12-07 VITALS — WEIGHT: 230 LBS | HEIGHT: 70 IN | BODY MASS INDEX: 32.93 KG/M2

## 2023-12-07 DIAGNOSIS — Z12.31 VISIT FOR SCREENING MAMMOGRAM: ICD-10-CM

## 2023-12-07 PROCEDURE — 77063 BREAST TOMOSYNTHESIS BI: CPT

## 2023-12-11 ENCOUNTER — OFFICE VISIT (OUTPATIENT)
Age: 48
End: 2023-12-11
Payer: COMMERCIAL

## 2023-12-11 VITALS
HEIGHT: 70 IN | OXYGEN SATURATION: 98 % | DIASTOLIC BLOOD PRESSURE: 88 MMHG | BODY MASS INDEX: 30.35 KG/M2 | RESPIRATION RATE: 16 BRPM | WEIGHT: 212 LBS | SYSTOLIC BLOOD PRESSURE: 153 MMHG | TEMPERATURE: 98.6 F | HEART RATE: 71 BPM

## 2023-12-11 DIAGNOSIS — R03.0 ELEVATED BLOOD PRESSURE READING: Primary | ICD-10-CM

## 2023-12-11 DIAGNOSIS — R53.83 FATIGUE, UNSPECIFIED TYPE: ICD-10-CM

## 2023-12-11 DIAGNOSIS — F41.1 GAD (GENERALIZED ANXIETY DISORDER): ICD-10-CM

## 2023-12-11 PROCEDURE — 99214 OFFICE O/P EST MOD 30 MIN: CPT | Performed by: NURSE PRACTITIONER

## 2023-12-11 RX ORDER — LOSARTAN POTASSIUM 100 MG/1
100 TABLET ORAL DAILY
Qty: 30 TABLET | Refills: 1 | Status: SHIPPED | OUTPATIENT
Start: 2023-12-11

## 2023-12-11 ASSESSMENT — PATIENT HEALTH QUESTIONNAIRE - PHQ9
SUM OF ALL RESPONSES TO PHQ QUESTIONS 1-9: 0
1. LITTLE INTEREST OR PLEASURE IN DOING THINGS: 0
SUM OF ALL RESPONSES TO PHQ9 QUESTIONS 1 & 2: 0
2. FEELING DOWN, DEPRESSED OR HOPELESS: 0
SUM OF ALL RESPONSES TO PHQ QUESTIONS 1-9: 0

## 2023-12-11 ASSESSMENT — ENCOUNTER SYMPTOMS
SHORTNESS OF BREATH: 0
CHEST TIGHTNESS: 0

## 2023-12-12 LAB
ALBUMIN SERPL-MCNC: 3.6 G/DL (ref 3.5–5)
ALBUMIN/GLOB SERPL: 1.2 (ref 1.1–2.2)
ALP SERPL-CCNC: 61 U/L (ref 45–117)
ALT SERPL-CCNC: 20 U/L (ref 12–78)
ANION GAP SERPL CALC-SCNC: 5 MMOL/L (ref 5–15)
AST SERPL-CCNC: 14 U/L (ref 15–37)
BILIRUB SERPL-MCNC: 0.2 MG/DL (ref 0.2–1)
BUN SERPL-MCNC: 16 MG/DL (ref 6–20)
BUN/CREAT SERPL: 21 (ref 12–20)
CALCIUM SERPL-MCNC: 8.8 MG/DL (ref 8.5–10.1)
CHLORIDE SERPL-SCNC: 108 MMOL/L (ref 97–108)
CO2 SERPL-SCNC: 27 MMOL/L (ref 21–32)
CREAT SERPL-MCNC: 0.77 MG/DL (ref 0.55–1.02)
ERYTHROCYTE [DISTWIDTH] IN BLOOD BY AUTOMATED COUNT: 12.4 % (ref 11.5–14.5)
GLOBULIN SER CALC-MCNC: 3.1 G/DL (ref 2–4)
GLUCOSE SERPL-MCNC: 110 MG/DL (ref 65–100)
HCT VFR BLD AUTO: 37.1 % (ref 35–47)
HGB BLD-MCNC: 12.4 G/DL (ref 11.5–16)
MCH RBC QN AUTO: 28.5 PG (ref 26–34)
MCHC RBC AUTO-ENTMCNC: 33.4 G/DL (ref 30–36.5)
MCV RBC AUTO: 85.3 FL (ref 80–99)
NRBC # BLD: 0 K/UL (ref 0–0.01)
NRBC BLD-RTO: 0 PER 100 WBC
PLATELET # BLD AUTO: 212 K/UL (ref 150–400)
PMV BLD AUTO: 10.9 FL (ref 8.9–12.9)
POTASSIUM SERPL-SCNC: 3.7 MMOL/L (ref 3.5–5.1)
PROT SERPL-MCNC: 6.7 G/DL (ref 6.4–8.2)
RBC # BLD AUTO: 4.35 M/UL (ref 3.8–5.2)
SODIUM SERPL-SCNC: 140 MMOL/L (ref 136–145)
TSH SERPL DL<=0.05 MIU/L-ACNC: 1.17 UIU/ML (ref 0.36–3.74)
WBC # BLD AUTO: 7.2 K/UL (ref 3.6–11)

## 2024-01-29 ENCOUNTER — OFFICE VISIT (OUTPATIENT)
Age: 49
End: 2024-01-29
Payer: COMMERCIAL

## 2024-01-29 VITALS
TEMPERATURE: 98.2 F | BODY MASS INDEX: 30.35 KG/M2 | DIASTOLIC BLOOD PRESSURE: 73 MMHG | HEIGHT: 70 IN | WEIGHT: 212 LBS | OXYGEN SATURATION: 97 % | HEART RATE: 79 BPM | SYSTOLIC BLOOD PRESSURE: 112 MMHG | RESPIRATION RATE: 16 BRPM

## 2024-01-29 DIAGNOSIS — I10 PRIMARY HYPERTENSION: Primary | ICD-10-CM

## 2024-01-29 PROCEDURE — 99213 OFFICE O/P EST LOW 20 MIN: CPT | Performed by: NURSE PRACTITIONER

## 2024-01-29 PROCEDURE — G8427 DOCREV CUR MEDS BY ELIG CLIN: HCPCS | Performed by: NURSE PRACTITIONER

## 2024-01-29 PROCEDURE — 3078F DIAST BP <80 MM HG: CPT | Performed by: NURSE PRACTITIONER

## 2024-01-29 PROCEDURE — G8417 CALC BMI ABV UP PARAM F/U: HCPCS | Performed by: NURSE PRACTITIONER

## 2024-01-29 PROCEDURE — 1036F TOBACCO NON-USER: CPT | Performed by: NURSE PRACTITIONER

## 2024-01-29 PROCEDURE — 3074F SYST BP LT 130 MM HG: CPT | Performed by: NURSE PRACTITIONER

## 2024-01-29 PROCEDURE — G8484 FLU IMMUNIZE NO ADMIN: HCPCS | Performed by: NURSE PRACTITIONER

## 2024-01-29 RX ORDER — LOSARTAN POTASSIUM 100 MG/1
100 TABLET ORAL DAILY
Qty: 30 TABLET | Refills: 5 | Status: SHIPPED | OUTPATIENT
Start: 2024-01-29

## 2024-01-29 ASSESSMENT — PATIENT HEALTH QUESTIONNAIRE - PHQ9
SUM OF ALL RESPONSES TO PHQ QUESTIONS 1-9: 0
SUM OF ALL RESPONSES TO PHQ QUESTIONS 1-9: 0
1. LITTLE INTEREST OR PLEASURE IN DOING THINGS: 0
SUM OF ALL RESPONSES TO PHQ9 QUESTIONS 1 & 2: 0
SUM OF ALL RESPONSES TO PHQ QUESTIONS 1-9: 0
SUM OF ALL RESPONSES TO PHQ QUESTIONS 1-9: 0
2. FEELING DOWN, DEPRESSED OR HOPELESS: 0

## 2024-01-29 ASSESSMENT — ENCOUNTER SYMPTOMS
SHORTNESS OF BREATH: 0
CHEST TIGHTNESS: 0

## 2024-01-29 NOTE — PROGRESS NOTES
Chief Complaint   Patient presents with    Hypertension     Follow up     \"Have you been to the ER, urgent care clinic since your last visit?  Hospitalized since your last visit?\"    NO    “Have you seen or consulted any other health care providers outside of [facility] since your last visit?”    YES - When: approximately 1  weeks ago.  Where and Why: dr. Zenaida sauer at [facility].    “Have you had a colorectal cancer screening such as a colonoscopy/FIT/Cologuard?    NO                 1/29/2024    11:11 AM   PHQ-9    Little interest or pleasure in doing things 0   Feeling down, depressed, or hopeless 0   PHQ-2 Score 0   PHQ-9 Total Score 0           Financial Resource Strain: Not on file      Food Insecurity: Not on file (7/2/2023)          Health Maintenance Due   Topic Date Due    Diabetes screen  Never done    Colorectal Cancer Screen  Never done

## 2024-01-29 NOTE — PROGRESS NOTES
Subjective:      Patient ID: Chelsea Moss is a 48 y.o. female.    Hypertension  Pertinent negatives include no chest pain, headaches, palpitations or shortness of breath.     1 month follow up HTN.  Began losartan at last OV.  Tolerating medication without adverse side effects. BP improved.    Has been following a more healthy diet and exercising more regularly.    Patient Active Problem List   Diagnosis    [place]GIA (generalized anxiety disorder)    Primary hypertension     Current Outpatient Medications   Medication Sig    losartan (COZAAR) 100 MG tablet Take 1 tablet by mouth daily    escitalopram (LEXAPRO) 20 MG tablet Take 1 tablet by mouth daily    norethindrone-ethinyl estradiol (JUNEL FE 1/20) 1-20 MG-MCG per tablet Take 1 tablet by mouth daily    buPROPion (WELLBUTRIN) 75 MG tablet Take 1 tablet by mouth daily     No current facility-administered medications for this visit.     Social History     Tobacco Use    Smoking status: Never    Smokeless tobacco: Never   Vaping Use    Vaping Use: Never used   Substance Use Topics    Alcohol use: No    Drug use: No     Blood pressure 112/73, pulse 79, temperature 98.2 °F (36.8 °C), temperature source Temporal, resp. rate 16, height 1.778 m (5' 10\"), weight 96.2 kg (212 lb), last menstrual period 01/12/2024, SpO2 97 %.    Review of Systems   Respiratory:  Negative for chest tightness and shortness of breath.    Cardiovascular:  Negative for chest pain, palpitations and leg swelling.   Neurological:  Negative for light-headedness and headaches.   All other systems reviewed and are negative.      Objective:   Physical Exam  Constitutional:       General: She is not in acute distress.  Cardiovascular:      Rate and Rhythm: Normal rate and regular rhythm.      Heart sounds: Normal heart sounds.   Pulmonary:      Effort: Pulmonary effort is normal.      Breath sounds: Normal breath sounds.   Musculoskeletal:      Cervical back: Neck supple.      Right lower leg: No edema.

## 2024-03-05 DIAGNOSIS — F41.1 GAD (GENERALIZED ANXIETY DISORDER): ICD-10-CM

## 2024-03-05 RX ORDER — BUPROPION HYDROCHLORIDE 75 MG/1
75 TABLET ORAL DAILY
Qty: 90 TABLET | Refills: 1 | Status: SHIPPED | OUTPATIENT
Start: 2024-03-05

## 2024-03-05 NOTE — TELEPHONE ENCOUNTER
PCP: Kenya Paniagua APRN - NP      No future appointments.    Requested Prescriptions     Pending Prescriptions Disp Refills    buPROPion (WELLBUTRIN) 75 MG tablet [Pharmacy Med Name: BUPROPION HCL TABS 75MG] 90 tablet 3     Sig: TAKE 1 TABLET DAILY      LOV 01/29/2024

## 2024-04-12 ENCOUNTER — OFFICE VISIT (OUTPATIENT)
Age: 49
End: 2024-04-12
Payer: COMMERCIAL

## 2024-04-12 VITALS
SYSTOLIC BLOOD PRESSURE: 115 MMHG | DIASTOLIC BLOOD PRESSURE: 77 MMHG | HEIGHT: 70 IN | TEMPERATURE: 98.2 F | HEART RATE: 88 BPM | OXYGEN SATURATION: 99 % | WEIGHT: 193.4 LBS | BODY MASS INDEX: 27.69 KG/M2

## 2024-04-12 DIAGNOSIS — A09 TRAVELER'S DIARRHEA: Primary | ICD-10-CM

## 2024-04-12 PROCEDURE — 1036F TOBACCO NON-USER: CPT

## 2024-04-12 PROCEDURE — 99213 OFFICE O/P EST LOW 20 MIN: CPT

## 2024-04-12 PROCEDURE — G8427 DOCREV CUR MEDS BY ELIG CLIN: HCPCS

## 2024-04-12 PROCEDURE — 3074F SYST BP LT 130 MM HG: CPT

## 2024-04-12 PROCEDURE — 3078F DIAST BP <80 MM HG: CPT

## 2024-04-12 PROCEDURE — G8417 CALC BMI ABV UP PARAM F/U: HCPCS

## 2024-04-12 RX ORDER — AZITHROMYCIN 500 MG/1
500 TABLET, FILM COATED ORAL DAILY
Qty: 3 TABLET | Refills: 0 | Status: SHIPPED | OUTPATIENT
Start: 2024-04-12 | End: 2024-04-15

## 2024-04-12 NOTE — PROGRESS NOTES
Chief Complaint   Patient presents with    Diarrhea     [place]. Nausea, vomiting, and diarrhea. Stomach pain x a week. All are the same thing minus two kids and everyone is sick but the kids.      \"Have you been to the ER, urgent care clinic since your last visit?  Hospitalized since your last visit?\"    NO    “Have you seen or consulted any other health care providers outside of [facility] since your last visit?”    NO                   1/29/2024    11:11 AM   PHQ-9    Little interest or pleasure in doing things 0   Feeling down, depressed, or hopeless 0   PHQ-2 Score 0   PHQ-9 Total Score 0           Financial Resource Strain: Not on file      Food Insecurity: Not on file (7/2/2023)          Health Maintenance Due   Topic Date Due    Colorectal Cancer Screen  05/09/2024        
Tenderness: There is no abdominal tenderness. There is no guarding.   Musculoskeletal:      Cervical back: Normal range of motion.   Skin:     General: Skin is warm and dry.   Neurological:      General: No focal deficit present.      Mental Status: She is alert and oriented to person, place, and time.   Psychiatric:         Mood and Affect: Mood normal.         Behavior: Behavior normal.         ASSESSMENT AND PLAN  1. Traveler's diarrhea  -will check CMP for dehydration and electrolyte imbalances  - Comprehensive Metabolic Panel; Future  - Culture, Stool; Future  - azithromycin (ZITHROMAX) 500 MG tablet; Take 1 tablet by mouth daily for 3 days  Dispense: 3 tablet; Refill: 0  -encouraged hydration and bland diet  -ED precautions reviewed.     VENKATA Rivera-BC

## 2024-04-13 LAB
ALBUMIN SERPL-MCNC: 3.5 G/DL (ref 3.5–5)
ALBUMIN/GLOB SERPL: 1.3 (ref 1.1–2.2)
ALP SERPL-CCNC: 60 U/L (ref 45–117)
ALT SERPL-CCNC: 36 U/L (ref 12–78)
ANION GAP SERPL CALC-SCNC: 5 MMOL/L (ref 5–15)
AST SERPL-CCNC: 16 U/L (ref 15–37)
BILIRUB SERPL-MCNC: 0.5 MG/DL (ref 0.2–1)
BUN SERPL-MCNC: 15 MG/DL (ref 6–20)
BUN/CREAT SERPL: 20 (ref 12–20)
CALCIUM SERPL-MCNC: 8.9 MG/DL (ref 8.5–10.1)
CHLORIDE SERPL-SCNC: 107 MMOL/L (ref 97–108)
CO2 SERPL-SCNC: 31 MMOL/L (ref 21–32)
CREAT SERPL-MCNC: 0.74 MG/DL (ref 0.55–1.02)
GLOBULIN SER CALC-MCNC: 2.6 G/DL (ref 2–4)
GLUCOSE SERPL-MCNC: 103 MG/DL (ref 65–100)
POTASSIUM SERPL-SCNC: 3.1 MMOL/L (ref 3.5–5.1)
PROT SERPL-MCNC: 6.1 G/DL (ref 6.4–8.2)
SODIUM SERPL-SCNC: 143 MMOL/L (ref 136–145)

## 2024-04-21 LAB
BACTERIA SPEC CULT: NORMAL
CAMPYLOBACTER STL CULT: NORMAL
E COLI SXT STL QL IA: NEGATIVE
SALMONELLA/SHIGELLA SCREEN: NORMAL
SPECIMEN SOURCE: NORMAL

## 2024-04-24 DIAGNOSIS — I10 PRIMARY HYPERTENSION: ICD-10-CM

## 2024-04-25 RX ORDER — LOSARTAN POTASSIUM 100 MG/1
100 TABLET ORAL DAILY
Qty: 90 TABLET | Refills: 0 | Status: SHIPPED | OUTPATIENT
Start: 2024-04-25

## 2024-04-25 NOTE — TELEPHONE ENCOUNTER
PCP: Kenya Paniagua APRN - NP      No future appointments.    Requested Prescriptions     Pending Prescriptions Disp Refills    losartan (COZAAR) 100 MG tablet [Pharmacy Med Name: LOSARTAN TABS 100MG] 90 tablet 0       Prior labs and Blood pressures:  BP Readings from Last 3 Encounters:   04/12/24 115/77   01/29/24 112/73   12/11/23 (!) 153/88     Lab Results   Component Value Date/Time     04/12/2024 02:12 PM    K 3.1 04/12/2024 02:12 PM     04/12/2024 02:12 PM    CO2 31 04/12/2024 02:12 PM    BUN 15 04/12/2024 02:12 PM    GFRAA 119 02/11/2021 12:00 AM     No results found for: \"HBA1C\", \"[id number]\"  Lab Results   Component Value Date/Time    CHOL 151 02/11/2021 12:00 AM    HDL 48 02/11/2021 12:00 AM    VLDL 11 02/11/2021 12:00 AM     No results found for: \"VITD3\", \"VD3RIA\"    Lab Results   Component Value Date/Time    TSH 2.430 12/30/2019 10:28 AM      LOV 01/29/2024

## 2024-05-01 DIAGNOSIS — F41.1 GAD (GENERALIZED ANXIETY DISORDER): ICD-10-CM

## 2024-05-01 RX ORDER — ESCITALOPRAM OXALATE 20 MG/1
20 TABLET ORAL DAILY
Qty: 90 TABLET | Refills: 1 | Status: SHIPPED | OUTPATIENT
Start: 2024-05-01

## 2024-05-01 NOTE — TELEPHONE ENCOUNTER
PCP: Kenya Paniagua APRN - NP      No future appointments.    Requested Prescriptions     Pending Prescriptions Disp Refills    escitalopram (LEXAPRO) 20 MG tablet [Pharmacy Med Name: ESCITALOPRAM TABS 20MG] 90 tablet 3     Sig: TAKE 1 TABLET DAILY       Prior labs and Blood pressures:  BP Readings from Last 3 Encounters:   04/12/24 115/77   01/29/24 112/73   12/11/23 (!) 153/88     Lab Results   Component Value Date/Time     04/12/2024 02:12 PM    K 3.1 04/12/2024 02:12 PM     04/12/2024 02:12 PM    CO2 31 04/12/2024 02:12 PM    BUN 15 04/12/2024 02:12 PM    GFRAA 119 02/11/2021 12:00 AM     No results found for: \"HBA1C\", \"[id number]\"  Lab Results   Component Value Date/Time    CHOL 151 02/11/2021 12:00 AM    HDL 48 02/11/2021 12:00 AM    LDL 92 02/11/2021 12:00 AM    VLDL 11 02/11/2021 12:00 AM     No results found for: \"VITD3\", \"VD3RIA\"    Lab Results   Component Value Date/Time    TSH 2.430 12/30/2019 10:28 AM      LOV 01/29/2024

## 2024-08-12 DIAGNOSIS — I10 PRIMARY HYPERTENSION: ICD-10-CM

## 2024-08-13 RX ORDER — LOSARTAN POTASSIUM 100 MG/1
100 TABLET ORAL DAILY
Qty: 90 TABLET | Refills: 0 | Status: SHIPPED | OUTPATIENT
Start: 2024-08-13 | End: 2024-08-15 | Stop reason: SDUPTHER

## 2024-08-13 RX ORDER — LOSARTAN POTASSIUM 100 MG/1
100 TABLET ORAL DAILY
Qty: 90 TABLET | Refills: 3 | OUTPATIENT
Start: 2024-08-13

## 2024-08-13 NOTE — TELEPHONE ENCOUNTER
PCP: Kenya Paniagua APRN - NP    Last Appt:4/12/2024    Future Appointments   Date Time Provider Department Center   9/9/2024 10:40 AM Kenya Paniagua APRN - NP [facility] E[facility] DEP       Requested Prescriptions     Pending Prescriptions Disp Refills    losartan (COZAAR) 100 MG tablet 30 tablet 0     Sig: Take 1 tablet by mouth daily     Refused Prescriptions Disp Refills    losartan (COZAAR) 100 MG tablet [Pharmacy Med Name: LOSARTAN TABS 100MG] 90 tablet 3     Sig: TAKE 1 TABLET DAILY     Refused By: DENIA HERMAN     Reason for Refusal: Patient needs an appointment       Prior labs and Blood pressures:  BP Readings from Last 3 Encounters:   04/12/24 115/77   01/29/24 112/73   12/11/23 (!) 153/88     Lab Results   Component Value Date/Time     04/12/2024 02:12 PM    K 3.1 04/12/2024 02:12 PM     04/12/2024 02:12 PM    CO2 31 04/12/2024 02:12 PM    BUN 15 04/12/2024 02:12 PM    GFRAA 119 02/11/2021 12:00 AM     No results found for: \"HBA1C\", \"[id number]\"  Lab Results   Component Value Date/Time    CHOL 151 02/11/2021 12:00 AM    HDL 48 02/11/2021 12:00 AM    LDL 92 02/11/2021 12:00 AM    VLDL 11 02/11/2021 12:00 AM     No results found for: \"VITD3\"    Lab Results   Component Value Date/Time    TSH 1.17 12/11/2023 02:28 PM

## 2024-08-13 NOTE — TELEPHONE ENCOUNTER
Pt's scheduled to see MOLINA Paniagua on 9/9/24 @ 10:40 AM however she will be out of her Losartan before the appt.Can MOLINA Paniagua send in a temporary refill?

## 2024-08-15 DIAGNOSIS — I10 PRIMARY HYPERTENSION: ICD-10-CM

## 2024-08-15 RX ORDER — LOSARTAN POTASSIUM 100 MG/1
100 TABLET ORAL DAILY
Qty: 90 TABLET | Refills: 0 | Status: SHIPPED | OUTPATIENT
Start: 2024-08-15

## 2024-08-15 NOTE — TELEPHONE ENCOUNTER
PCP: Kenya Paniagua APRN - NP    Last appt: 4/12/2024     Future Appointments   Date Time Provider Department Center   9/9/2024 10:40 AM Kenya Paniagua APRN - NP [facility] E[facility] DEP       Requested Prescriptions     Pending Prescriptions Disp Refills    losartan (COZAAR) 100 MG tablet 90 tablet 0     Sig: Take 1 tablet by mouth daily         Prior labs and Blood pressures:  BP Readings from Last 3 Encounters:   04/12/24 115/77   01/29/24 112/73   12/11/23 (!) 153/88     Lab Results   Component Value Date/Time     04/12/2024 02:12 PM    K 3.1 04/12/2024 02:12 PM     04/12/2024 02:12 PM    CO2 31 04/12/2024 02:12 PM    BUN 15 04/12/2024 02:12 PM    GFRAA 119 02/11/2021 12:00 AM     No results found for: \"HBA1C\", \"[id number]\"  Lab Results   Component Value Date/Time    CHOL 151 02/11/2021 12:00 AM    HDL 48 02/11/2021 12:00 AM    LDL 92 02/11/2021 12:00 AM    VLDL 11 02/11/2021 12:00 AM     No results found for: \"VITD3\"    Lab Results   Component Value Date/Time    TSH 1.17 12/11/2023 02:28 PM

## 2024-09-02 DIAGNOSIS — F41.1 GAD (GENERALIZED ANXIETY DISORDER): ICD-10-CM

## 2024-09-03 RX ORDER — BUPROPION HYDROCHLORIDE 75 MG/1
75 TABLET ORAL DAILY
Qty: 90 TABLET | Refills: 1 | Status: SHIPPED | OUTPATIENT
Start: 2024-09-03

## 2024-09-06 SDOH — ECONOMIC STABILITY: FOOD INSECURITY: WITHIN THE PAST 12 MONTHS, YOU WORRIED THAT YOUR FOOD WOULD RUN OUT BEFORE YOU GOT MONEY TO BUY MORE.: NEVER TRUE

## 2024-09-06 SDOH — ECONOMIC STABILITY: INCOME INSECURITY: HOW HARD IS IT FOR YOU TO PAY FOR THE VERY BASICS LIKE FOOD, HOUSING, MEDICAL CARE, AND HEATING?: NOT HARD AT ALL

## 2024-09-06 SDOH — ECONOMIC STABILITY: TRANSPORTATION INSECURITY
IN THE PAST 12 MONTHS, HAS LACK OF TRANSPORTATION KEPT YOU FROM MEETINGS, WORK, OR FROM GETTING THINGS NEEDED FOR DAILY LIVING?: NO

## 2024-09-06 SDOH — ECONOMIC STABILITY: FOOD INSECURITY: WITHIN THE PAST 12 MONTHS, THE FOOD YOU BOUGHT JUST DIDN'T LAST AND YOU DIDN'T HAVE MONEY TO GET MORE.: NEVER TRUE

## 2024-09-09 ENCOUNTER — OFFICE VISIT (OUTPATIENT)
Age: 49
End: 2024-09-09
Payer: COMMERCIAL

## 2024-09-09 VITALS
RESPIRATION RATE: 16 BRPM | OXYGEN SATURATION: 99 % | HEART RATE: 85 BPM | SYSTOLIC BLOOD PRESSURE: 101 MMHG | HEIGHT: 70 IN | TEMPERATURE: 98.9 F | BODY MASS INDEX: 23.48 KG/M2 | WEIGHT: 164 LBS | DIASTOLIC BLOOD PRESSURE: 66 MMHG

## 2024-09-09 DIAGNOSIS — I10 PRIMARY HYPERTENSION: Primary | ICD-10-CM

## 2024-09-09 DIAGNOSIS — R63.4 WEIGHT LOSS: ICD-10-CM

## 2024-09-09 DIAGNOSIS — F41.1 GAD (GENERALIZED ANXIETY DISORDER): ICD-10-CM

## 2024-09-09 LAB
ALBUMIN SERPL-MCNC: 3.7 G/DL (ref 3.5–5)
ALBUMIN/GLOB SERPL: 1.4 (ref 1.1–2.2)
ALP SERPL-CCNC: 56 U/L (ref 45–117)
ALT SERPL-CCNC: 15 U/L (ref 12–78)
ANION GAP SERPL CALC-SCNC: 2 MMOL/L (ref 2–12)
AST SERPL-CCNC: 10 U/L (ref 15–37)
BILIRUB SERPL-MCNC: 0.9 MG/DL (ref 0.2–1)
BUN SERPL-MCNC: 18 MG/DL (ref 6–20)
BUN/CREAT SERPL: 22 (ref 12–20)
CALCIUM SERPL-MCNC: 8.8 MG/DL (ref 8.5–10.1)
CHLORIDE SERPL-SCNC: 106 MMOL/L (ref 97–108)
CO2 SERPL-SCNC: 32 MMOL/L (ref 21–32)
CREAT SERPL-MCNC: 0.81 MG/DL (ref 0.55–1.02)
GLOBULIN SER CALC-MCNC: 2.6 G/DL (ref 2–4)
GLUCOSE SERPL-MCNC: 99 MG/DL (ref 65–100)
POTASSIUM SERPL-SCNC: 3.5 MMOL/L (ref 3.5–5.1)
PROT SERPL-MCNC: 6.3 G/DL (ref 6.4–8.2)
SODIUM SERPL-SCNC: 140 MMOL/L (ref 136–145)

## 2024-09-09 PROCEDURE — 1036F TOBACCO NON-USER: CPT | Performed by: NURSE PRACTITIONER

## 2024-09-09 PROCEDURE — 99214 OFFICE O/P EST MOD 30 MIN: CPT | Performed by: NURSE PRACTITIONER

## 2024-09-09 PROCEDURE — 3074F SYST BP LT 130 MM HG: CPT | Performed by: NURSE PRACTITIONER

## 2024-09-09 PROCEDURE — 3078F DIAST BP <80 MM HG: CPT | Performed by: NURSE PRACTITIONER

## 2024-09-09 PROCEDURE — G8420 CALC BMI NORM PARAMETERS: HCPCS | Performed by: NURSE PRACTITIONER

## 2024-09-09 PROCEDURE — G8427 DOCREV CUR MEDS BY ELIG CLIN: HCPCS | Performed by: NURSE PRACTITIONER

## 2024-09-09 ASSESSMENT — PATIENT HEALTH QUESTIONNAIRE - PHQ9
SUM OF ALL RESPONSES TO PHQ9 QUESTIONS 1 & 2: 0
SUM OF ALL RESPONSES TO PHQ QUESTIONS 1-9: 0
1. LITTLE INTEREST OR PLEASURE IN DOING THINGS: NOT AT ALL
2. FEELING DOWN, DEPRESSED OR HOPELESS: NOT AT ALL

## 2024-09-09 ASSESSMENT — ENCOUNTER SYMPTOMS
SHORTNESS OF BREATH: 0
CHEST TIGHTNESS: 0

## 2024-09-10 DIAGNOSIS — I10 PRIMARY HYPERTENSION: Primary | ICD-10-CM

## 2024-09-10 RX ORDER — LOSARTAN POTASSIUM 50 MG/1
50 TABLET ORAL DAILY
Qty: 30 TABLET | Refills: 5 | Status: SHIPPED | OUTPATIENT
Start: 2024-09-10

## 2024-10-28 DIAGNOSIS — F41.1 GAD (GENERALIZED ANXIETY DISORDER): ICD-10-CM

## 2024-10-28 RX ORDER — ESCITALOPRAM OXALATE 20 MG/1
20 TABLET ORAL DAILY
Qty: 90 TABLET | Refills: 1 | Status: SHIPPED | OUTPATIENT
Start: 2024-10-28

## 2024-10-28 NOTE — TELEPHONE ENCOUNTER
PCP: Kenya Paniagua APRN - NP    Last appt: 9/9/2024   Future Appointments   Date Time Provider Department Center   12/11/2024  9:30 AM [facility] 4 [facility]       Requested Prescriptions     Pending Prescriptions Disp Refills    escitalopram (LEXAPRO) 20 MG tablet [Pharmacy Med Name: ESCITALOPRAM TABS 20MG] 90 tablet 3     Sig: TAKE 1 TABLET DAILY         Prior labs and Blood pressures:  BP Readings from Last 3 Encounters:   09/09/24 101/66   04/12/24 115/77   01/29/24 112/73     Lab Results   Component Value Date/Time     09/09/2024 11:22 AM    K 3.5 09/09/2024 11:22 AM     09/09/2024 11:22 AM    CO2 32 09/09/2024 11:22 AM    BUN 18 09/09/2024 11:22 AM    GFRAA 119 02/11/2021 12:00 AM     No results found for: \"HBA1C\", \"[id number]\"  Lab Results   Component Value Date/Time    CHOL 151 02/11/2021 12:00 AM    HDL 48 02/11/2021 12:00 AM    LDL 92 02/11/2021 12:00 AM    VLDL 11 02/11/2021 12:00 AM     No results found for: \"VITD3\"    Lab Results   Component Value Date/Time    TSH 1.17 12/11/2023 02:28 PM

## 2024-12-11 ENCOUNTER — HOSPITAL ENCOUNTER (OUTPATIENT)
Facility: HOSPITAL | Age: 49
Discharge: HOME | End: 2024-12-14
Payer: COMMERCIAL

## 2024-12-11 VITALS — BODY MASS INDEX: 21.76 KG/M2 | WEIGHT: 152 LBS | HEIGHT: 70 IN

## 2024-12-11 DIAGNOSIS — Z12.31 VISIT FOR SCREENING MAMMOGRAM: ICD-10-CM

## 2024-12-11 PROCEDURE — 77063 BREAST TOMOSYNTHESIS BI: CPT

## 2025-02-28 DIAGNOSIS — F41.1 GAD (GENERALIZED ANXIETY DISORDER): ICD-10-CM

## 2025-02-28 RX ORDER — BUPROPION HYDROCHLORIDE 75 MG/1
75 TABLET ORAL DAILY
Qty: 90 TABLET | Refills: 0 | Status: SHIPPED | OUTPATIENT
Start: 2025-02-28

## 2025-04-29 ENCOUNTER — TELEPHONE (OUTPATIENT)
Age: 50
End: 2025-04-29

## 2025-04-29 NOTE — TELEPHONE ENCOUNTER
Patient call stating had a message to call the office about a refill.    Tried contacting patient to advise needs an appt to be scheduled- and refill request will be sent to provider.    JANIA to schedule appt and to call back w/date and then will send message to [place].  Keri Craft    Patient returned call and advised patient to schedule- patient stated she will call to schedule and callback w/date.  Keri Craft

## 2025-05-01 DIAGNOSIS — F41.1 GAD (GENERALIZED ANXIETY DISORDER): ICD-10-CM

## 2025-05-01 RX ORDER — ESCITALOPRAM OXALATE 20 MG/1
20 TABLET ORAL DAILY
Qty: 90 TABLET | Refills: 1 | Status: SHIPPED | OUTPATIENT
Start: 2025-05-01

## 2025-05-29 DIAGNOSIS — F41.1 GAD (GENERALIZED ANXIETY DISORDER): ICD-10-CM

## 2025-05-29 RX ORDER — BUPROPION HYDROCHLORIDE 75 MG/1
TABLET ORAL
Qty: 30 TABLET | Refills: 0 | Status: SHIPPED | OUTPATIENT
Start: 2025-05-29

## 2025-05-29 NOTE — TELEPHONE ENCOUNTER
PCP: Kenya Paniagua APRN - NP    Last appt: 9/9/2024     Future Appointments   Date Time Provider Department Center   6/12/2025 11:00 AM [facility]       Requested Prescriptions     Pending Prescriptions Disp Refills    buPROPion (WELLBUTRIN) 75 MG tablet [Pharmacy Med Name: BUPROPION HCL TABS 75MG] 90 tablet 3     Sig: TAKE 1 TABLET DAILY (APPOINTMENT DUE)       Prior labs and Blood pressures:  BP Readings from Last 3 Encounters:   09/09/24 101/66   04/12/24 115/77   01/29/24 112/73     Lab Results   Component Value Date/Time     09/09/2024 11:22 AM    K 3.5 09/09/2024 11:22 AM     09/09/2024 11:22 AM    CO2 32 09/09/2024 11:22 AM    BUN 18 09/09/2024 11:22 AM    GFRAA 119 02/11/2021 12:00 AM     No results found for: \"HBA1C\", \"[id number]\"  Lab Results   Component Value Date/Time    CHOL 151 02/11/2021 12:00 AM    HDL 48 02/11/2021 12:00 AM    LDL 92 02/11/2021 12:00 AM    VLDL 11 02/11/2021 12:00 AM     No results found for: \"VITD3\"    Lab Results   Component Value Date/Time    TSH 1.17 12/11/2023 02:28 PM

## 2025-06-09 SDOH — ECONOMIC STABILITY: FOOD INSECURITY: WITHIN THE PAST 12 MONTHS, THE FOOD YOU BOUGHT JUST DIDN'T LAST AND YOU DIDN'T HAVE MONEY TO GET MORE.: NEVER TRUE

## 2025-06-09 SDOH — ECONOMIC STABILITY: INCOME INSECURITY: IN THE LAST 12 MONTHS, WAS THERE A TIME WHEN YOU WERE NOT ABLE TO PAY THE MORTGAGE OR RENT ON TIME?: NO

## 2025-06-09 SDOH — ECONOMIC STABILITY: FOOD INSECURITY: WITHIN THE PAST 12 MONTHS, YOU WORRIED THAT YOUR FOOD WOULD RUN OUT BEFORE YOU GOT MONEY TO BUY MORE.: NEVER TRUE

## 2025-06-09 SDOH — ECONOMIC STABILITY: TRANSPORTATION INSECURITY
IN THE PAST 12 MONTHS, HAS THE LACK OF TRANSPORTATION KEPT YOU FROM MEDICAL APPOINTMENTS OR FROM GETTING MEDICATIONS?: NO

## 2025-06-30 ENCOUNTER — OFFICE VISIT (OUTPATIENT)
Age: 50
End: 2025-06-30
Payer: COMMERCIAL

## 2025-06-30 VITALS
RESPIRATION RATE: 18 BRPM | WEIGHT: 151.8 LBS | OXYGEN SATURATION: 98 % | DIASTOLIC BLOOD PRESSURE: 86 MMHG | BODY MASS INDEX: 21.73 KG/M2 | TEMPERATURE: 96.8 F | SYSTOLIC BLOOD PRESSURE: 122 MMHG | HEIGHT: 70 IN | HEART RATE: 85 BPM

## 2025-06-30 DIAGNOSIS — F41.1 GAD (GENERALIZED ANXIETY DISORDER): ICD-10-CM

## 2025-06-30 DIAGNOSIS — I10 PRIMARY HYPERTENSION: Primary | ICD-10-CM

## 2025-06-30 DIAGNOSIS — E66.3 OVERWEIGHT: ICD-10-CM

## 2025-06-30 DIAGNOSIS — M25.551 RIGHT HIP PAIN: ICD-10-CM

## 2025-06-30 PROCEDURE — G8420 CALC BMI NORM PARAMETERS: HCPCS | Performed by: NURSE PRACTITIONER

## 2025-06-30 PROCEDURE — 99214 OFFICE O/P EST MOD 30 MIN: CPT | Performed by: NURSE PRACTITIONER

## 2025-06-30 PROCEDURE — 1036F TOBACCO NON-USER: CPT | Performed by: NURSE PRACTITIONER

## 2025-06-30 PROCEDURE — G8427 DOCREV CUR MEDS BY ELIG CLIN: HCPCS | Performed by: NURSE PRACTITIONER

## 2025-06-30 PROCEDURE — 3074F SYST BP LT 130 MM HG: CPT | Performed by: NURSE PRACTITIONER

## 2025-06-30 PROCEDURE — 3079F DIAST BP 80-89 MM HG: CPT | Performed by: NURSE PRACTITIONER

## 2025-06-30 ASSESSMENT — PATIENT HEALTH QUESTIONNAIRE - PHQ9
SUM OF ALL RESPONSES TO PHQ QUESTIONS 1-9: 0
1. LITTLE INTEREST OR PLEASURE IN DOING THINGS: NOT AT ALL
SUM OF ALL RESPONSES TO PHQ QUESTIONS 1-9: 0
2. FEELING DOWN, DEPRESSED OR HOPELESS: NOT AT ALL
SUM OF ALL RESPONSES TO PHQ QUESTIONS 1-9: 0
SUM OF ALL RESPONSES TO PHQ QUESTIONS 1-9: 0

## 2025-06-30 ASSESSMENT — ENCOUNTER SYMPTOMS
SHORTNESS OF BREATH: 0
CHEST TIGHTNESS: 0

## 2025-06-30 NOTE — PROGRESS NOTES
Chief Complaint   Patient presents with    Weight Management    Anxiety     Follow up         \"Have you been to the ER, urgent care clinic since your last visit?  Hospitalized since your last visit?\"    NO    “Have you seen or consulted any other health care providers outside of [facility] since your last visit?”    NO            Click Here for Release of Records Request           6/30/2025     8:44 AM   PHQ-9    Little interest or pleasure in doing things 0   Feeling down, depressed, or hopeless 0   PHQ-2 Score 0   PHQ-9 Total Score 0           Financial Resource Strain: Not on file      Food Insecurity: No Food Insecurity (6/9/2025)    Hunger Vital Sign     Worried About Running Out of Food in the Last Year: Never true     Ran Out of Food in the Last Year: Never true          Health Maintenance Due   Topic Date Due    HIV screen  Never done    Hepatitis B vaccine (1 of 3 - 19+ 3-dose series) Never done    COVID-19 Vaccine (7 - 2024-25 season) 09/01/2024

## 2025-06-30 NOTE — PROGRESS NOTES
Subjective:      Patient ID: Chelsea Moss is a 49 y.o. female     HPI  Follow up health problems.    HTN.  Stopped losartan when BP decreased.  Mild diastolic elevation today.    Overweight on semaglutide prescribed at weight loss clinic.  Has had total weight loss of 61 lb.  Has started weaning med.  [place] on lexapro and wellbutrin with good symptom control.   C/o pain right lateral lower hip region.  Has been going on for several months, began after doing a lot of driving.  Has been doing some stretches recently, working with  which has helped.  No leg weakness.     Patient Active Problem List   Diagnosis    [place] (generalized anxiety disorder)    Primary hypertension     Current Outpatient Medications   Medication Sig    buPROPion (WELLBUTRIN) 75 MG tablet TAKE 1 TABLET DAILY (APPOINTMENT DUE)    escitalopram (LEXAPRO) 20 MG tablet Take 1 tablet by mouth daily    Semaglutide, 1 MG/DOSE, (OZEMPIC) 4 MG/3ML SOPN sc injection Inject 8 mg into the skin every 7 days    norethindrone-ethinyl estradiol (JUNEL FE 1/20) 1-20 MG-MCG per tablet Take 1 tablet by mouth daily     No current facility-administered medications for this visit.     Social History     Tobacco Use    Smoking status: Never     Passive exposure: Never    Smokeless tobacco: Never   Vaping Use    Vaping status: Never Used   Substance Use Topics    Alcohol use: No    Drug use: No     Blood pressure 122/86, pulse 85, temperature 96.8 °F (36 °C), temperature source Temporal, resp. rate 18, height 1.778 m (5' 10\"), weight 68.9 kg (151 lb 12.8 oz), SpO2 98%.    Review of Systems   Respiratory:  Negative for chest tightness and shortness of breath.    Cardiovascular:  Negative for chest pain, palpitations and leg swelling.   Musculoskeletal:  Positive for arthralgias (right hip pain). Negative for joint swelling.   Neurological:  Negative for light-headedness and headaches.   Psychiatric/Behavioral:  Negative for dysphoric mood and sleep

## 2025-06-30 NOTE — ASSESSMENT & PLAN NOTE
Mildly elevated off BP med.  Recommend interim BP monitoring.  Report if greater than 130 systolic, 90 diastolic.

## 2025-08-07 DIAGNOSIS — F41.1 GAD (GENERALIZED ANXIETY DISORDER): ICD-10-CM

## 2025-08-07 RX ORDER — BUPROPION HYDROCHLORIDE 75 MG/1
75 TABLET ORAL DAILY
Qty: 90 TABLET | Refills: 1 | Status: SHIPPED | OUTPATIENT
Start: 2025-08-07

## 2025-08-21 ENCOUNTER — OFFICE VISIT (OUTPATIENT)
Age: 50
End: 2025-08-21
Payer: COMMERCIAL

## 2025-08-21 VITALS
WEIGHT: 149 LBS | HEIGHT: 70 IN | DIASTOLIC BLOOD PRESSURE: 70 MMHG | TEMPERATURE: 96.9 F | OXYGEN SATURATION: 97 % | BODY MASS INDEX: 21.33 KG/M2 | SYSTOLIC BLOOD PRESSURE: 100 MMHG | HEART RATE: 90 BPM | RESPIRATION RATE: 16 BRPM

## 2025-08-21 DIAGNOSIS — I10 PRIMARY HYPERTENSION: ICD-10-CM

## 2025-08-21 DIAGNOSIS — Z00.00 ROUTINE GENERAL MEDICAL EXAMINATION AT A HEALTH CARE FACILITY: Primary | ICD-10-CM

## 2025-08-21 DIAGNOSIS — F41.1 GAD (GENERALIZED ANXIETY DISORDER): ICD-10-CM

## 2025-08-21 PROCEDURE — 3078F DIAST BP <80 MM HG: CPT | Performed by: NURSE PRACTITIONER

## 2025-08-21 PROCEDURE — 99396 PREV VISIT EST AGE 40-64: CPT | Performed by: NURSE PRACTITIONER

## 2025-08-21 PROCEDURE — 3074F SYST BP LT 130 MM HG: CPT | Performed by: NURSE PRACTITIONER

## 2025-08-21 RX ORDER — BUPROPION HYDROCHLORIDE 100 MG/1
100 TABLET, EXTENDED RELEASE ORAL DAILY
Qty: 90 TABLET | Refills: 1 | Status: SHIPPED | OUTPATIENT
Start: 2025-08-21

## 2025-08-21 ASSESSMENT — LIFESTYLE VARIABLES
HOW OFTEN DO YOU HAVE A DRINK CONTAINING ALCOHOL: NEVER
HOW MANY STANDARD DRINKS CONTAINING ALCOHOL DO YOU HAVE ON A TYPICAL DAY: PATIENT DOES NOT DRINK

## 2025-08-21 ASSESSMENT — ENCOUNTER SYMPTOMS
SHORTNESS OF BREATH: 0
CHEST TIGHTNESS: 0

## 2025-08-22 LAB
ALBUMIN SERPL-MCNC: 3.8 G/DL (ref 3.5–5.2)
ALBUMIN/GLOB SERPL: 1.6 (ref 1.1–2.2)
ALP SERPL-CCNC: 51 U/L (ref 35–104)
ALT SERPL-CCNC: 22 U/L (ref 10–35)
ANION GAP SERPL CALC-SCNC: 10 MMOL/L (ref 2–14)
AST SERPL-CCNC: 19 U/L (ref 10–35)
BILIRUB SERPL-MCNC: 0.6 MG/DL (ref 0–1.2)
BUN SERPL-MCNC: 15 MG/DL (ref 6–20)
BUN/CREAT SERPL: 20 (ref 12–20)
CALCIUM SERPL-MCNC: 9 MG/DL (ref 8.6–10)
CHLORIDE SERPL-SCNC: 103 MMOL/L (ref 98–107)
CHOLEST SERPL-MCNC: 160 MG/DL (ref 0–200)
CO2 SERPL-SCNC: 27 MMOL/L (ref 20–29)
CREAT SERPL-MCNC: 0.78 MG/DL (ref 0.6–1)
ERYTHROCYTE [DISTWIDTH] IN BLOOD BY AUTOMATED COUNT: 11.8 % (ref 11.5–14.5)
EST. AVERAGE GLUCOSE BLD GHB EST-MCNC: 89 MG/DL
GLOBULIN SER CALC-MCNC: 2.4 G/DL (ref 2–4)
GLUCOSE SERPL-MCNC: 117 MG/DL (ref 65–100)
HBA1C MFR BLD: 4.7 % (ref 4–5.6)
HCT VFR BLD AUTO: 38 % (ref 35–47)
HDLC SERPL-MCNC: 46 MG/DL (ref 40–60)
HDLC SERPL: 3.5 (ref 0–5)
HGB BLD-MCNC: 12.8 G/DL (ref 11.5–16)
LDLC SERPL CALC-MCNC: 100 MG/DL (ref 0–100)
MCH RBC QN AUTO: 31 PG (ref 26–34)
MCHC RBC AUTO-ENTMCNC: 33.7 G/DL (ref 30–36.5)
MCV RBC AUTO: 92 FL (ref 80–99)
NRBC # BLD: 0 K/UL (ref 0–0.01)
NRBC BLD-RTO: 0 PER 100 WBC
PLATELET # BLD AUTO: 180 K/UL (ref 150–400)
PMV BLD AUTO: 11.2 FL (ref 8.9–12.9)
POTASSIUM SERPL-SCNC: 4 MMOL/L (ref 3.5–5.1)
PROT SERPL-MCNC: 6.2 G/DL (ref 6.4–8.3)
RBC # BLD AUTO: 4.13 M/UL (ref 3.8–5.2)
SODIUM SERPL-SCNC: 139 MMOL/L (ref 136–145)
TRIGL SERPL-MCNC: 69 MG/DL (ref 0–150)
VLDLC SERPL CALC-MCNC: 14 MG/DL
WBC # BLD AUTO: 5.7 K/UL (ref 3.6–11)